# Patient Record
Sex: MALE | Race: WHITE
[De-identification: names, ages, dates, MRNs, and addresses within clinical notes are randomized per-mention and may not be internally consistent; named-entity substitution may affect disease eponyms.]

---

## 2023-08-31 ENCOUNTER — HOSPITAL ENCOUNTER
Age: 53
Discharge: HOME | End: 2023-08-31
Payer: COMMERCIAL

## 2023-08-31 DIAGNOSIS — R19.7: Primary | ICD-10-CM

## 2023-08-31 LAB — C. DIFFICILE PCR: NEGATIVE

## 2023-08-31 PROCEDURE — 87045 FECES CULTURE AEROBIC BACT: CPT

## 2023-08-31 PROCEDURE — 36415 COLL VENOUS BLD VENIPUNCTURE: CPT

## 2023-08-31 PROCEDURE — 87046 STOOL CULTR AEROBIC BACT EA: CPT

## 2023-08-31 PROCEDURE — 87427 SHIGA-LIKE TOXIN AG IA: CPT

## 2023-08-31 PROCEDURE — 87798 DETECT AGENT NOS DNA AMP: CPT

## 2023-08-31 PROCEDURE — 83631 LACTOFERRIN FECAL (QUANT): CPT

## 2023-08-31 PROCEDURE — 87493 C DIFF AMPLIFIED PROBE: CPT

## 2023-08-31 PROCEDURE — 87425 ROTAVIRUS AG IA: CPT

## 2023-08-31 PROCEDURE — 87177 OVA AND PARASITES SMEARS: CPT

## 2023-08-31 PROCEDURE — 87209 SMEAR COMPLEX STAIN: CPT

## 2023-08-31 PROCEDURE — G0328 FECAL BLOOD SCRN IMMUNOASSAY: HCPCS

## 2024-10-24 ENCOUNTER — HOSPITAL ENCOUNTER
Dept: HOSPITAL 101 - LAB | Age: 54
Discharge: HOME | End: 2024-10-24
Payer: COMMERCIAL

## 2024-10-24 DIAGNOSIS — Z12.5: ICD-10-CM

## 2024-10-24 DIAGNOSIS — Z00.00: Primary | ICD-10-CM

## 2024-10-24 DIAGNOSIS — R63.5: ICD-10-CM

## 2024-10-24 DIAGNOSIS — R73.9: ICD-10-CM

## 2024-10-24 LAB — THYROID STIMULATING HORMONE: 2.66 UIU/ML (ref 0.36–3.74)

## 2024-10-24 PROCEDURE — 36415 COLL VENOUS BLD VENIPUNCTURE: CPT

## 2024-10-24 PROCEDURE — G0103 PSA SCREENING: HCPCS

## 2024-10-24 PROCEDURE — 83036 HEMOGLOBIN GLYCOSYLATED A1C: CPT

## 2024-10-24 PROCEDURE — 84443 ASSAY THYROID STIM HORMONE: CPT

## 2024-10-28 ENCOUNTER — HOSPITAL ENCOUNTER
Dept: HOSPITAL 101 - LAB | Age: 54
Discharge: HOME | End: 2024-10-28
Payer: COMMERCIAL

## 2024-10-28 DIAGNOSIS — R97.20: Primary | ICD-10-CM

## 2024-10-28 PROCEDURE — 84154 ASSAY OF PSA FREE: CPT

## 2024-10-28 PROCEDURE — 36415 COLL VENOUS BLD VENIPUNCTURE: CPT

## 2024-10-28 PROCEDURE — 84153 ASSAY OF PSA TOTAL: CPT

## 2024-10-28 NOTE — XMS_ITS
Comprehensive CCD (C-CDA v2.1)  
  
                          Created on: 2024  
  
  
Harish Sargent  
External Reference #: CDR,PersonID:[id number]  
: 1970  
Sex: Male  
  
Demographics  
  
  
                                        Address             [address] [place] [address]  [address]  
   
                                        Mobile Phone        [phone]  
   
                                        Preferred Language  en  
   
                                        Marital Status        
   
                                        Orthodoxy Affiliation Unknown  
   
                                        Race                White  
   
                                        Ethnic Group        Not  or Lati  
no  
  
  
Author  
  
  
                                        Organization        [facility]  
  
  
Care Team Providers  
  
  
                                Care Team Member Name Role            Phone  
   
                                DR SHARRON GRANT Primary Care    Unavailable  
   
                                [employer]      Unavailable  
   
                                BLAKE MCINTOSH Attending       Unavailable  
   
                                ARNALDO, BLAKE Admitting       Unavailable  
   
                                ARNALDO, BLAKE Consulting      Unavailable  
   
                                FilipponeKarin Consult[employer]      Unavailable  
   
                                ParkinsonIssac bolton   Consulting      Unavailable  
   
                                JUANITA, DR MCDERMOTT Consulting      Unavailable  
   
                                JUANITA, DR MCDERMOTT Primary Care    Unavailable  
   
                                JUANITA, DR MCDERMOTT Admitting       Unavailable  
   
                                JUANITA, DR MCDERMOTT Attending       Unavailable  
   
                                JUANITA, DR MCDERMOTT Consulting      Unavailable  
   
                                JUANITA, DR MCDERMOTT Primary Care    Unavailable  
   
                                JUANITA, DR MCDERMOTT Admitting       Unavailable  
   
                                JUANITA, DR MCDERMOTT Attending       Unavailable  
   
                                FABRICE BARNEY Consulting      Unavailable  
   
                                Sharron Grant DO Primary Care Provider [phone]  
   
                                Sharron Grant DO Primary Care Provider [phone]  
   
                                Sharron Grant DO Primary Care Provider [phone]   
   
                                SHARRON GRANT Primary Care    Unavailable  
   
                                CHAKA HERMAN Referring       Unavailable  
   
                                CHAKA HERMAN Attending       Unavailable  
   
                                CHAKA HERMAN Referring       Unavailable  
   
                                SHARRON GRANT Primary Care    Unavailable  
   
                                SHARRON GRANT Primary Care    Unavailable  
  
  
  
Medications  
Current Medications  
  
  
  
                      Medication Drug Class(es) Dates      Sig (Normalized) Sig   
(Original)  
   
                                                    apixaban 5 mg oral   
tablet  
(10 sources)                            Factor Xa   
Inhibitor                               Start:   
10-                              take 5 mg by mouth   
once daily                              Apixaban Active 5   
MG PO Daily   
2024 12:00am  
  
  
  
                                        Start: 2024   take 1 tablet by dora  
th twice   
daily                                   apixaban (ELIQUIS) 5 mg tab(s)   
Indications: Chronic anticoagulation   
Take 1 tablet by mouth two times a   
day. 0 2024 Active  
   
                                                    Start: 2021  
End: 2023                         take 1 tablet by mouth twice   
daily                                   apixaban (ELIQUIS) 5 mg tab(s) Take 1   
tablet by mouth twice daily. 180   
tablet 1 2022 Active  
  
  
  
                                        Comment on above:   Take 1 tablet by dora  
th twice daily.   
   
                                                    ascorbic acid 500 mg   
oral capsule  
(1 source)                Vitamin C                 Start:   
10-                              take 500 mg by   
mouth once   
daily                                   Ascorbic Acid   
(Vitamin C) Active   
500 MG PO daily    
12:00am  
   
                                                    omeprazole 40 mg   
delayed release oral   
capsule  
(11 sources)                            Proton Pump   
Inhibitor                               Start:   
2021  
End:   
10-                                          omeprazole   
(PRILOSEC) 40 mg   
capsule  
  
  
  
                                                    Start: 04-  
End: 2021                         take 40 mg by mouth twice   
daily                                   Omeprazole Discontinued 40 MG PO   
Twice daily 168 84 2021   
12:00am 2021 10:08am  
  
  
  
                                                    zinc glycinate  
(1 source)                              Start: 10-   take 7.5 mg by mouth  
   
once daily                              Zinc Glycinate Active 7.5   
MG PO daily  12:00am PT TAKES   
OTC ZINC DAILY  
  
  
  
Completed/Discontinued Medications  
  
  
  
                      Medication Drug Class(es) Dates      Sig (Normalized) Sig   
(Original)  
   
                                                    azithromycin 250 mg   
oral tablet  
(1 source)                              Macrolide   
Antimicrobial                           Start:   
10-                                          azithromycin   
(ZITHROMAX) 250 mg   
tablet  
  
  
  
Problems  
Active Problems  
  
  
                      Problem Classification Problem    Date       Documented Da  
te Episodic/Chronic  
   
                                                    Deficiency and other   
anemia  
(11 sources)                            Iron deficiency   
anemia due to blood   
loss; Translations:   
[Iron deficiency   
anemia secondary to   
blood loss   
(chronic)]                              Onset:   
2021                Chronic  
   
                                                    Deficiency and other   
anemia  
(8 sources)                             Anemia due to blood   
loss; Translations:   
[Iron deficiency   
anemia secondary to   
blood loss   
(chronic)]                              Onset:   
2021                Chronic  
   
                                                    Deficiency and other   
anemia  
(4 sources)                             Iron deficiency   
anemia,   
unspecified;   
Translations: [IRON   
DEFICIENCY ANEMIA   
UNSPECIFIED]                            Onset:   
2021                                          Episodic  
   
                                                    Deficiency and other   
anemia  
(10 sources)                            Anemia;   
Translations:   
[Anemia,   
unspecified]                            Onset:   
2021                Episodic  
   
                                                    Deficiency and other   
anemia  
(1 source)                              Iron deficiency   
anemia;   
Translations: [Iron   
deficiency anemia,   
unspecified]                            10-          Episodic  
   
                                                    Diabetes mellitus   
without complication  
(2 sources)                             Hyperglycemia;   
Translations:   
[Hyperglycemia,   
unspecified]                            10-          Episodic  
   
                                                    Malaise and fatigue  
(3 sources)                             Weakness;   
Translations:   
[WEAKNESS]                              Onset:   
2021                                          Episodic  
   
                                                    Other aftercare  
(5 sources)                             Long-term current   
use of   
anticoagulant;   
Translations: [Long   
term (current) use   
of anticoagulants]                      Onset:   
2023                                          Episodic  
   
                                                    Other and ill-defined   
heart disease  
(1 source)                              Other ill-defined   
heart diseases;   
Translations:   
[OTHER ILL-DEFINED   
HEART DISEASES]                         Onset:   
2021                                          Chronic  
   
                                                    Other gastrointestinal   
disorders  
(11 sources)                            Malabsorption -   
iron; Translations:   
[Other intestinal   
malabsorption]                          Onset:   
2021                Chronic  
   
                                                    Other injuries and   
conditions due to   
external causes  
(1 source)                              Injury of eye   
region;   
Translations:   
[Unspecified injury   
of unspecified eye   
and orbit, initial   
encounter]                              2021          Episodic  
   
                                                    Other liver diseases  
(1 source)                              Abnormal levels of   
other serum   
enzymes;   
Translations:   
[ABNORMAL LEVELS   
OTHER SERUM   
ENZYMES]                                Onset:   
2021                                          Episodic  
   
                                                    Other lower respiratory   
disease  
(1 source)                              Dyspnea;   
Translations:   
[Dyspnea,   
unspecified]                            2023          Episodic  
   
                                                    Other screening for   
suspected conditions   
(not mental disorders   
or infectious disease)  
(2 sources)                             Patient encounter   
status;   
Translations:   
[Encounter for   
screening for   
malignant neoplasm   
of prostate]                            10-          Episodic  
   
                                                    Phlebitis;   
thrombophlebitis and   
thromboembolism  
(5 sources)                             Bilateral deep vein   
thrombosis of lower   
extremities;   
Translations:   
[Acute embolism and   
thrombosis of   
unspecified deep   
veins of proximal   
lower extremity,   
bilateral]                                                  Episodic  
   
                                                    Pulmonary heart disease  
(1 source)                              Saddle embolus of   
pulmonary artery   
without acute cor   
pulmonale;   
Translations:   
[SADDLE EMBOL PA   
W/O AC COR   
PULMONAL]                               Onset:   
2021                                          Chronic  
   
                                                    Unclassified  
(1 source)                              CONTACT W/AND   
(SUSP) EXPOS   
COVID-19;   
Translations:   
[CONTACT W/AND   
(SUSP) EXPOS   
COVID-19]                               Onset:   
2021                                            
  
  
Past or Other Problems  
  
  
                                                    Problem   
Classification  Problem         Date            Documented Date Episodic/Chronic  
   
                                                    Abdominal hernia  
(1 source)                              Diaphragmatic hernia   
without obstruction   
or gangrene;   
Translations: [DIAPH   
HERNIA W/O   
OBST/GANGRENE]                          Onset:   
04-                                          Episodic  
   
                                                    Chronic obstructive   
pulmonary disease and   
bronchiectasis  
(1 source)                              Bronchitis, not   
specified as acute   
or chronic;   
Translations:   
[BRONCHITIS NOT SPEC   
AS ACUTE/CHRON]                         Onset:   
04-                                          Episodic  
   
                                                    Nonspecific chest   
pain  
(1 source)                              Other chest pain;   
Translations: [OTHER   
CHEST PAIN]                             Onset:   
04-                                          Episodic  
   
                                                    Other lower   
respiratory disease  
(4 sources)                             Other forms of   
dyspnea;   
Translations: [OTHER   
FORMS OF DYSPNEA]                       Onset:   
2021                                          Episodic  
   
                                                    Other nutritional;   
endocrine; and   
metabolic disorders  
(1 source)                              Abnormal weight   
loss; Translations:   
[ABNORMAL WEIGHT   
LOSS]                                   Onset:   
04-                                          Episodic  
   
                                                    Pulmonary heart   
disease  
(4 sources)                             Infarction of lung   
due to embolus;   
Translations: [Other   
pulmonary embolism   
without acute cor   
pulmonale]                              Onset:   
2023                                          Episodic  
  
  
  
Results  
  
  
                          Test Name    Value        Interpretation Reference   
Range                                   Facility  
   
                                                    CBC W Auto Differential pane  
l (Bld)on 2024   
   
                                                    Basophils (Bld)   
[#/Vol]         0.04 10*3/uL    Normal          <0.11           [facility]  
   
                                        Comment on above:   Order Comment: Speci  
men Type: BLOOD SPECIMEN  
Ordering Facility: [facility]  
Address: [address] [place] [address]   
   
                                                            Performed By: #### 5  
7021-8 ####  
[facility]  
CLIA [id number]  
[address] [place] [address]   
   
                                                    Basophils/100 WBC   
(Bld)           0.9 %           Normal                          [facility]  
   
                                        Comment on above:   Order Comment: Speci  
men Type: BLOOD SPECIMEN  
Ordering Facility: [facility]  
Address: [address] [place] [address]   
   
                                                            Performed By: #### 5  
7021-8 ####  
[facility]  
CLIA [id number]  
[address] [place] [address]   
   
                                                    Differential cell   
count method Nom   
(Bld)           Auto            Normal                          [facility]  
   
                                        Comment on above:   Order Comment: Speci  
men Type: BLOOD SPECIMEN  
Ordering Facility: [facility]  
Address: [facility] [place] [address]   
   
                                                            Performed By: #### 5  
7021-8 ####  
[facility]  
CLIA [id number]  
[address] [place] [address]   
   
                                                    Eosinophils (Bld)   
[#/Vol]         0.13 10*3/uL    Normal          <0.46           [facility]  
   
                                        Comment on above:   Order Comment: Speci  
men Type: BLOOD SPECIMEN  
Ordering Facility: [facility]  
Address: [address] [place] [address]   
   
                                                            Performed By: #### 5  
7021-8 ####  
[facility]  
CLIA [id number]  
[address] [place] [address]   
   
                                                    Eosinophils/100 WBC   
(Bld)           2.8 %           Normal                          [facility]  
   
                                        Comment on above:   Order Comment: Speci  
men Type: BLOOD SPECIMEN  
Ordering Facility: [facility]  
Address: [address] [place] [address]   
   
                                                            Performed By: #### 5  
7021-8 ####  
[facility]  
CLIA [id number]  
[address] [place] [address]   
   
                                                    Erythrocyte   
distribution width   
(RBC) [Ratio]   15.2 %          High            11.5-15.0       [facility]  
   
                                        Comment on above:   Order Comment: Speci  
men Type: BLOOD SPECIMEN  
Ordering Facility: [facility]  
Address: [address] [place] [address]   
   
                                                            Performed By: #### 5  
7021-8 ####  
[facility]  
CLIA [id number]  
[address] [place] [address]   
   
                                                    Hematocrit (Bld)   
[Volume fraction] 46.2 %          Normal          39.0-51.0       [facility]  
   
                                        Comment on above:   Order Comment: Speci  
men Type: BLOOD SPECIMEN  
Ordering Facility: [facility]  
Address: [address] [place] [address]   
   
                                                            Performed By: #### 5  
7021-8 ####  
[facility]  
CLIA [id number]  
[address] [place] [address]   
   
                                                    Hemoglobin (Bld)   
[Mass/Vol]      15.0 g/dL       Normal          13.0-17.0       [facility]  
   
                                        Comment on above:   Order Comment: Speci  
men Type: BLOOD SPECIMEN  
Ordering Facility: [facility]  
Address: [address] [place] [address]   
   
                                                            Performed By: #### 5  
7021-8 ####  
[facility]  
CLIA [id number]  
[address] [place] [address]   
   
                                                    Immature granulocytes   
(Bld) [#/Vol]   10*3/uL         Normal          <0.10           [facility]  
   
                                        Comment on above:   Order Comment: Speci  
men Type: BLOOD SPECIMEN  
Ordering Facility: [facility]  
Address: [address] [place] [address]   
   
                                                            Performed By: #### 5  
7021-8 ####  
[facility]  
CLIA [id number]  
[address] [place] [address]   
   
                                                    Immature   
granulocytes/100 WBC   
(Bld)           0.4 %           Normal                          [facility]  
   
                                        Comment on above:   Order Comment: Speci  
men Type: BLOOD SPECIMEN  
Ordering Facility: [facility]  
Address: [address] [place] [address]   
   
                                                            Performed By: #### 5  
7021-8 ####  
[facility]  
CLIA [id number]  
[address] [place] [address]   
   
                                                    Lymphocytes (Bld)   
[#/Vol]         1.15 10*3/uL    Normal          1.00-4.00       [facility]  
   
                                        Comment on above:   Order Comment: Speci  
men Type: BLOOD SPECIMEN  
Ordering Facility: [facility]  
Address: [address] [place] [address]   
   
                                                            Performed By: #### 5  
7021-8 ####  
[facility]  
CLIA [id number]  
[address] [place] [address]   
   
                                                    Lymphocytes/100 WBC   
(Bld)           24.6 %          Normal                          [facility]  
   
                                        Comment on above:   Order Comment: Speci  
men Type: BLOOD SPECIMEN  
Ordering Facility: [facility]  
Address: [address] [place] [address]   
   
                                                            Performed By: #### 5  
7021-8 ####  
[facility]  
CLIA [id number]  
[address] [place] [address]   
   
                                                    MCH (RBC) [Entitic   
mass]           29.8 pg         Normal          26.0-34.0       [facility]  
   
                                        Comment on above:   Order Comment: Speci  
men Type: BLOOD SPECIMEN  
Ordering Facility: [facility]  
Address: [address] [place] [address]   
   
                                                            Performed By: #### 5  
7021-8 ####  
[facility]  
CLIA [id number]  
[address] [place] [address]   
   
                      MCHC (RBC) [Mass/Vol] 32.5 g/dL  Normal     30.5-36.0  [facility]  
   
                                        Comment on above:   Order Comment: Speci  
men Type: BLOOD SPECIMEN  
Ordering Facility: [facility]  
Address: [address] [place] [address]   
   
                                                            Performed By: #### 5  
7021-8 ####  
[facility]  
CLIA [id number]  
[address] [place] [address]   
   
                                                    MCV (RBC) [Entitic   
vol]            91.7 fL         Normal          80.0-100.0      [facility]  
   
                                        Comment on above:   Order Comment: Speci  
men Type: BLOOD SPECIMEN  
Ordering Facility: [facility]  
Address: [address] [place] [address]   
   
                                                            Performed By: #### 5  
7021-8 ####  
[facility]  
CLIA [id number]  
[address] [place] [address]   
   
                                                    Monocytes (Bld)   
[#/Vol]         0.55 10*3/uL    Normal          <0.87           [facility]  
   
                                        Comment on above:   Order Comment: Speci  
men Type: BLOOD SPECIMEN  
Ordering Facility: [facility]  
Address: [address] [place] [address]   
   
                                                            Performed By: #### 5  
7021-8 ####  
[facility]  
CLIA [id number]  
[address] [place] [address]   
   
                                                    Monocytes/100 WBC   
(Bld)           11.8 %          Normal                          [facility]  
   
                                        Comment on above:   Order Comment: Speci  
men Type: BLOOD SPECIMEN  
Ordering Facility: [facility]  
Address: [address] [place] [address]   
   
                                                            Performed By: #### 5  
7021-8 ####  
[facility]  
CLIA [id number]  
[address] [place] [address]   
   
                                                    Neutrophils (Bld)   
[#/Vol]         2.78 10*3/uL    Normal          1.45-7.50       [facility]  
   
                                        Comment on above:   Order Comment: Speci  
men Type: BLOOD SPECIMEN  
Ordering Facility: [facility]  
Address: [address] [place] [address]   
   
                                                            Performed By: #### 5  
7021-8 ####  
[facility]  
CLIA [id number]  
[address] [place] [address]   
   
                                                    Neutrophils/100 WBC   
(Bld)           59.5 %          Normal                          [facility]  
   
                                        Comment on above:   Order Comment: Speci  
men Type: BLOOD SPECIMEN  
Ordering Facility: [facility]  
Address: [address] [place] [address]   
   
                                                            Performed By: #### 5  
7021-8 ####  
[facility]  
CLIA [id number]  
[address] [place] [address]   
   
                                                    Nucleated RBC (Bld)   
[#/Vol]         10*3/uL         Normal          <0.01           [facility]  
   
                                        Comment on above:   Order Comment: Speci  
men Type: BLOOD SPECIMEN  
Ordering Facility: [facility]  
Address: [address] [place] [address]   
   
                                                            Performed By: #### 5  
7021-8 ####  
[facility]  
CLIA [id number]  
[address] [place] [address]   
   
                                                    Nucleated RBC/100 WBC   
(Bld) [Ratio]   0.0 /100 WBC    Normal                          [facility]  
   
                                        Comment on above:   Order Comment: Speci  
men Type: BLOOD SPECIMEN  
Ordering Facility: [facility]  
Address: [address] [place] [address]   
   
                                                            Performed By: #### 5  
7021-8 ####  
[facility]  
CLIA [id number]  
[address] [place] [address]   
   
                                                    Platelet mean volume   
(Bld) [Entitic vol] 10.7 fL         Normal          9.0-12.7        [facility]  
   
                                        Comment on above:   Order Comment: Speci  
men Type: BLOOD SPECIMEN  
Ordering Facility: [facility]  
Address: [address] [place] [address]   
   
                                                            Performed By: #### 5  
7021-8 ####  
[facility]  
CLIA [id number]  
[address] [place] [address]   
   
                                                    Platelets (Bld)   
[#/Vol]         209 10*3/uL     Normal          150-400         [facility]  
   
                                        Comment on above:   Order Comment: Speci  
men Type: BLOOD SPECIMEN  
Ordering Facility: [facility]  
Address: [address] [place] [address]   
   
                                                            Performed By: #### 5  
7021-8 ####  
[facility]  
CLIA [id number]  
[address] [place] [address]   
   
                      RBC (Bld) [#/Vol] 5.04 10*6/uL Normal     4.20-6.00  [facility]  
   
                                        Comment on above:   Order Comment: Speci  
men Type: BLOOD SPECIMEN  
Ordering Facility: [facility]  
Address: [address] [place], [place] [id number]   
   
                                                            Performed By: #### 5  
7021-8 ####  
[facility]  
CLIA [id number]  
[address] [place] [address]   
   
                      WBC (Bld) [#/Vol] 4.67 10*3/uL Normal     3.70-11.00 [facility]  
   
                                        Comment on above:   Order Comment: Speci  
men Type: BLOOD SPECIMEN  
Ordering Facility: [facility]  
Address: [address] [place], [place] [id number]   
   
                                                            Performed By: #### 5  
7021-8 ####  
[facility]  
CLIA [id number]  
[address] [place] [address]   
   
                                                    CNOVSPon 2024   
   
                                        CNOVS              Visit (SP) Office   
(HEMASA)  
--------------------  
HARISH SARGENT   
([id number])   
1970 M  
Date Time Provider   
Department  
24 9:00 AM   
CHAKA HERMAN  
During your visit   
today, we recorded   
the following   
information about   
you:  
Temperature Pulse   
Respiration Blood   
pressure  
97.4 degrees   
91/minute 16/minute   
134/86  
Weight Height  
97.9 kg 1.753 m  
Chaka Herman MD   
2024 8:14 AM   
Signed  
NAME: Harish Sargent  
CLINIC NO.: [id number]  
DATE OF SERVICE:   
2024   
(Ishmael)  
Some elements in   
this clinic note   
that are critical to   
medical decision   
making  
have been carefully   
reviewed and   
included from a   
prior clinic note   
dated: May  
12, 2023 (Ishmael)  
Referring Provider:   
Sharron Grant  
Additional   
Clinicians involved   
in Harish Sargent's   
care:  
DIAGNOSIS:  
Iron deficiency   
anemia  
ASSESSMENT: 54 year   
old man with   
significant anemia   
that is likely   
chronic and  
related to prior GI   
bleed. He does not   
appear to have   
additional bleeding  
although he is now   
anticoagulated since   
2021 for saddle   
embolus and left  
lower extremity DVT.   
He is very energetic   
and doesn't appear   
to have any  
decrease in exercise   
tolerance indicating   
an adapted state to   
his anemia. He  
also has a healthy   
diet with plenty of   
iron consumption   
indicating a likely  
malabsorptive   
condition to iron in   
addition to his   
prior loss of blood.   
His  
hypercoagulable   
workup was negative.  
There does not   
appear to be an   
underlying malignant   
process.  
Great response to   
iron. However   
several months later   
I suspect that he   
has  
underlying   
malabsorption in   
addition to chronic   
blood loss. This   
appears to  
have resolved.  
Suspect elevated   
creatinine is   
dependent on how   
well he hydrates.   
May need  
further workup on   
return.  
Continue Eliquis for   
now (PE - symptoms   
have resolved)  
PLAN:  
Labs, including iron   
studies, in 6 months  
RTC 1 week after  
Continue   
anticoagulation   
given massive PE  
____________________  
___________________-  
________  
HPI:  
CASE HISTORY:   
Reverse   
Chronological Order  
2023 - CBC   
6.77 > 16.1/49.1 <   
250, CMP normal,   
Iron 135, TIBC 448,  
transferrin   
saturation 30.1%,   
ferritin 20, B12   
431, folate 13.3  
2022 - 2nd   
dose Monoferric  
2022 - Volume   
associated renal   
failure / ATN -   
corrects with oral  
hydration  
2021 -   
Monoferric  
2021 - Persisting   
microcytic anemia  
10/2021 -   
Hypercoagulable   
evaluation negative  
2021 -   
Released from   
[facility]   
after Heparinization   
and long term  
anticoagulation.   
Refused Hematology   
consult.  
2021 - Lower   
extremity doppler +   
Left lower ext DVT   
of dist Femoral,  
popliteal and   
peroneal veins  
2021 -   
Bilateral PE and   
saddle embolus on   
CTA. Mildly   
prominent hilar  
and mediastinal   
nodes noted.  
2021 - CT   
Abdomen unremarkable   
except left   
hydronephrosis due   
to chronic  
UPJ obstruction  
2021 - Dr. Araiza repeat EGD -   
erosive esophagitis,   
hiatal hernia, no  
Lee's.  
2021 - ER   
evaluation for   
fatigue and dyspnea   
severe anemia -   
found to  
have esophageal   
ulcer  
Updated Visit, 2024:  
Harish returns today   
for a follow up. He   
has returned to work   
- he is a  
supervisor at [employer]. He does   
have significant   
exposure to lead, he  
endorses wearing   
PPE. He now takes   
zinc, vitamin C, and   
iron supplements on   
top  
of Eliquis. He no   
longer takes   
omeprazole as his   
GERD has improved.   
His CBC  
remains normal from   
my perspective.  
Updated Visit, May   
12, 2023:  
Harish continues to do   
very well. H/H   
completely normal   
now. Unsure what the  
cause of his initial   
loss and anemia was   
but likely had   
chronic bleeding   
from  
erosive esophagitis.  
Updated Visit,   
2022:   
Telephone only for 5   
minutes  
Called Harish as   
requested. He feels   
fantastic. Still on   
anticoagulation. CBC  
remains corrected   
with hgb 16. Will   
discuss d/c'ing   
anticoagulation at   
the next  
visit.  
Updated Visit, May   
13, 2022:  
Harish returns and   
feels great - his   
H/H has recovered   
fully. He remains on  
anticoagulation but   
is interested in   
stopping. Given the   
severity of his  
thrombotic events, I   
would recommend that   
he stay on for at   
tleast 1 year. He  
believes that   
COVID-19 was the   
cause of his DVT and   
pulmonary emboli. No   
other  
illiciting factors   
have been   
discovered.  
Updated Visit,   
2022:  
Continued   
improvement in   
Hgb/Hct.  
Still has microcytic   
indices.  
Still feels that he   
has no symptoms  
Unaware of kidney   
dysfunction -   
corrected after   
increasing H2O   
untake but now  
is worse again.  
May need renal   
workup, but patient   
would prefer to try   
and hydrate on his   
own  
and then reconsider.  
Will continue iron   
infusion today.  
Updated Visit,   
2022:  
Impressive   
improvement in H/H   
with iron infusion.   
CMP wasn't back   
until after  
he left. Will need   
to repeat next week   
to ensure he doesn't   
have ARF. He has  
no signs or symptoms   
of CHF. Will   
anticipate   
anticoagulation for   
1 year and  
then consi (more   
content not   
included)...        Normal                                  [facility]  
   
                                                    Comprehensive metabolic 2000  
 panelon 2024   
   
                      Albumin [Mass/Vol] 4.5 g/dL   Normal     3.9-4.9    [facility]  
   
                                        Comment on above:   Order Comment: Speci  
men Type: BLOOD SPECIMEN  
Ordering Facility: [facility]  
Address: [address] [place] [address]   
   
                                                            Performed By: #### 2  
4323-8 ####  
[facility]  
CLIA [id number]  
[address] [place] [address]   
   
                                                    ALP [Catalytic   
activity/Vol]   74 U/L          Normal                    [facility]  
   
                                        Comment on above:   Order Comment: Speci  
men Type: BLOOD SPECIMEN  
Ordering Facility: [facility]  
Address: [address] [place] [address]   
   
                                                            Performed By: #### 2  
4323-8 ####  
[facility]  
CLIA [id number]  
[address] [place] [address]   
   
                                                    ALT [Catalytic   
activity/Vol]   16 U/L          Normal          10-54           [facility]  
   
                                        Comment on above:   Order Comment: Speci  
men Type: BLOOD SPECIMEN  
Ordering Facility: [facility]  
Address: [address] [place] [address]   
   
                                                            Performed By: #### 2  
4323-8 ####  
[facility]  
CLIA [id number]  
[address] [place] [address]   
   
                      Anion gap [Moles/Vol] 10 mmol/L  Normal     8-15       [facility]  
   
                                        Comment on above:   Order Comment: Speci  
men Type: BLOOD SPECIMEN  
Ordering Facility: [facility]  
Address: [address] [place] [address]   
   
                                                            Performed By: #### 2  
4323-8 ####  
[facility]  
CLIA [id number]  
[address] [place] [address]   
   
                                                    AST [Catalytic   
activity/Vol]   21 U/L          Normal          14-40           [facility]  
   
                                        Comment on above:   Order Comment: Speci  
men Type: BLOOD SPECIMEN  
Ordering Facility: [facility]  
Address: [address] [place], [place] [address]   
   
                                                            Performed By: #### 2  
4323-8 ####  
[facility]  
CLIA [id number]  
[address] [place] [address]   
   
                      Bilirubin [Mass/Vol] 0.8 mg/dL  Normal     0.2-1.3    [facility]  
   
                                        Comment on above:   Order Comment: Speci  
men Type: BLOOD SPECIMEN  
Ordering Facility: [facility]  
Address: [address] [place] [address]   
   
                                                            Performed By: #### 2  
4323-8 ####  
[facility]  
CLIA [id number]  
[address] [place] [address]   
   
                      Calcium [Mass/Vol] 10.0 mg/dL Normal     8.5-10.2   [facility]  
   
                                        Comment on above:   Order Comment: Speci  
men Type: BLOOD SPECIMEN  
Ordering Facility: [facility]  
Address: [address] [place] [address]   
   
                                                            Performed By: #### 2  
4323-8 ####  
[facility]  
CLIA [id number]  
[address] [place] [address]   
   
                      Chloride [Moles/Vol] 107 mmol/L Normal          [facility]  
   
                                        Comment on above:   Order Comment: Speci  
men Type: BLOOD SPECIMEN  
Ordering Facility: [facility]  
Address: [address] [place] [address]   
   
                                                            Performed By: #### 2  
4323-8 ####  
[facility]  
CLIA [id number]  
[address] [place] [address]   
   
                      CO2 [Moles/Vol] 27 mmol/L  Normal     22-30      [facility]  
   
                                        Comment on above:   Order Comment: Speci  
men Type: BLOOD SPECIMEN  
Ordering Facility: [facility]  
Address: [address] [place] [address]   
   
                                                            Performed By: #### 2  
4323-8 ####  
[facility]  
CLIA [id number]  
[address] [place] [address]   
   
                      Creatinine [Mass/Vol] 1.10 mg/dL Normal     0.73-1.22  [facility]  
   
                                        Comment on above:   Order Comment: Speci  
men Type: BLOOD SPECIMEN  
Ordering Facility: [facility]  
Address: [address] [place] [address]   
   
                                                            Performed By: #### 2  
4323-8 ####  
[facility]  
CLIA [id number]  
[address] [place] [address]   
   
                                                    Creatinine and   
Glomerular filtration   
rate.predicted panel   
(S/P/Bld)       79 mL/min/1.73m??? Normal          >=60            [facility]  
   
                                        Comment on above:   Order Comment: Speci  
men Type: BLOOD SPECIMEN  
Ordering Facility: [facility]  
Address: [address] [place] [address]   
   
                                                            Result Comment: Teresa  
mated Glomerular Filtration Rate (eGFR) is   
calculated using the  CKD-EPI creatinine equation. This   
equation utilizes serum creatinine, sex, and age as parameters.   
The creatinine assay has traceable calibration to isotope   
dilution-mass spectrometry. Refer to KDIGO guidelines for   
clinical interpretation. In patients with unstable renal   
function, e.g. those with acute kidney injury, the eGFR may not   
accurately reflect actual GFR.   
   
                                                            Performed By: #### 2  
4323-8 ####  
[facility]  
CLIA [id number]  
[address] [place] [address]   
   
                      Glucose [Mass/Vol] 110 mg/dL  High       74-99      [facility]  
   
                                        Comment on above:   Order Comment: Speci  
men Type: BLOOD SPECIMEN  
Ordering Facility: [facility]  
Address: [address] [place] [address]   
   
                                                            Result Comment: The   
American Diabetes Association (ADA) provides   
guidance for cutoff values for fasting glucose and random   
glucose. The ADA defines fasting as no caloric intake for at   
least 8 hours. Fasting plasma glucose results between 100 to 125   
mg/dL indicate increased risk for diabetes (prediabetes).  
Fasting plasma glucose results greater than or equal to 126   
mg/dL meet the criteria for diagnosis of diabetes. In the   
absence of unequivocal hyperglycemia, results should be   
confirmed by repeat testing. In a patient with classic symptoms   
of hyperglycemia or hyperglycemic crisis, random plasma glucose   
results greater than or equal to 200 mg/dL meet the criteria for   
diagnosis of diabetes.  
Reference: Standards of Medical Care in Diabetes 2016, American   
Diabetes Association. Diabetes Care. 2016.39(Suppl 1).   
   
                                                            Performed By: #### 2  
4323-8 ####  
[facility]  
CLIA [id number]  
[address] [place] [address]   
   
                      Potassium [Moles/Vol] 4.5 mmol/L Normal     3.7-5.1    [facility]  
   
                                        Comment on above:   Order Comment: Speci  
men Type: BLOOD SPECIMEN  
Ordering Facility: [facility]  
Address: [address] [place] [address]   
   
                                                            Performed By: #### 2  
4323-8 ####  
[facility]  
CLIA [id number]  
[address] [place] [address]   
   
                      Protein [Mass/Vol] 7.0 g/dL   Normal     6.3-8.0    [facility]  
   
                                        Comment on above:   Order Comment: Speci  
men Type: BLOOD SPECIMEN  
Ordering Facility: [facility]  
Address: [address] [id number]   
   
                                                            Performed By: #### 2  
4323-8 ####  
[facility]  
CLIA [id number]  
[address] [place] [address]   
   
                      Sodium [Moles/Vol] 144 mmol/L Normal     136-144    [facility]  
   
                                        Comment on above:   Order Comment: Speci  
men Type: BLOOD SPECIMEN  
Ordering Facility: [facility]  
Address: [address] [place] [address]   
   
                                                            Performed By: #### 2  
4323-8 ####  
[facility]  
CLIA [id number]  
[address] [place] [address]   
   
                                                    Urea nitrogen   
[Mass/Vol]      17 mg/dL        Normal          9-24            [facility]  
   
                                        Comment on above:   Order Comment: Speci  
men Type: BLOOD SPECIMEN  
Ordering Facility: [facility]  
Address: [address] [place] [address]   
   
                                                            Performed By: #### 2  
4323-8 ####  
[facility]  
CLIA [id number]  
[address] [place] [address]   
   
                                                    Aleksandar 2024   
   
                                        CNPN                Telephone (HEMASA)  
--------------------  
HARISH SARGENT   
([id number])   
1970 M  
Date Time Provider   
Department  
24 BEAU MAYORGA  
During your visit   
today, we recorded   
the following   
information about   
you:  
Beau Mayorga MA   
2024 10:08 AM   
Signed  
Patient has an appt   
on . Would you   
like labs?  
Allergies As of   
Date: 2024  
(No Known Allergies)  
Date Reviewed:   
2023  
Reviewed by:   
Halina Leon MA   
- Fully Assessed  
Reason for Visit:  
Lab Orders [1688]  
Primary Visit   
Diagnosis:Chronic   
anticoagulation   
[Z79.01]  
Other Visit   
Diagnoses:Pulmonary   
embolus with   
infarction (HCC)   
[I26.99]  
Deep vein thrombosis   
(DVT) of proximal   
vein of both  
lower extremities,   
unspecified   
chronicity (HCC)  
[I82.4Y3]  
Poor iron absorption   
[K90.89]  
Order(s):COMPLETE   
BLOOD COUNT AND   
DIFFERENTIAL   
[SQCBCDIF] Order #:   
[id number]  
FUTURE  
COMPREHENSIVE   
METABOLIC PANEL   
[SQCMP] Order #:   
[id number] FUTURE  
Prescriptions as of   
2024  
- omeprazole   
(PRILOSEC) 40 mg   
capsule  
Problem List As Of   
Date 2024   
Noted Resolved  
Absolute anemia   
[D64.9] 2021  
Iron deficiency   
anemia due to   
chronic blood   
los*2021  
Poor iron absorption   
[K90.89] 2021  
Anemia due to GI   
blood loss [D50.0]   
2021  
Chronic   
anticoagulation   
[Z79.01] 2023  
Pulmonary embolus   
with infarction   
(HCC)   
[I26.99]2023  
Encounter Number:   
[id number]  
Encounter   
Status:Closed by   
ABHYANKAR, CHAKA on   
24             Normal                                  [facility]  
   
                                                    CBC W Auto Differential pane  
l (Bld)on 2023   
   
                                                    Basophils (Bld)   
[#/Vol]         0.05 10*3/uL    Normal          <0.11           [facility]  
   
                                        Comment on above:   Order Comment: Speci  
men Type: BLOOD SPECIMEN  
Ordering Facility: [facility]  
Address: [address] [place] [address]   
   
                                                            Performed By: #### 5  
7021-8 ####  
[facility]  
CLIA [id number]  
[address] [place] [address]   
   
                                                    Basophils/100 WBC   
(Bld)           0.7 %           Normal                          [facility]  
   
                                        Comment on above:   Order Comment: Speci  
men Type: BLOOD SPECIMEN  
Ordering Facility: [facility]  
Address: [address] [place] [address]   
   
                                                            Performed By: #### 5  
7021-8 ####  
[facility]  
CLIA [id number]  
[address] [place] [address]   
   
                                                    Differential cell   
count method Nom   
(Bld)           Auto            Normal                          [facility]  
   
                                        Comment on above:   Order Comment: Speci  
men Type: BLOOD SPECIMEN  
Ordering Facility: [facility]  
Address: [address] [place] [address]   
   
                                                            Performed By: #### 5  
7021-8 ####  
[facility]  
CLIA [id number]  
[address] [place] [address]   
   
                                                    Eosinophils (Bld)   
[#/Vol]         0.20 10*3/uL    Normal          <0.46           [facility]  
   
                                        Comment on above:   Order Comment: Speci  
men Type: BLOOD SPECIMEN  
Ordering Facility: [facility]  
Address: [address] [place] [address]   
   
                                                            Performed By: #### 5  
7021-8 ####  
[facility]  
CLIA [id number]  
[address] [place] [address]   
   
                                                    Eosinophils/100 WBC   
(Bld)           2.7 %           Normal                          [facility]  
   
                                        Comment on above:   Order Comment: Speci  
men Type: BLOOD SPECIMEN  
Ordering Facility: [facility]  
Address: [address] [place] [address]   
   
                                                            Performed By: #### 5  
7021-8 ####  
[place] [facility]  
CLIA [id number]  
[address] [place] [address]   
   
                                                    Erythrocyte   
distribution width   
(RBC) [Ratio]   13.0 %          Normal          11.5-15.0       [facility]  
   
                                        Comment on above:   Order Comment: Speci  
men Type: BLOOD SPECIMEN  
Ordering Facility: [facility]  
Address:  [place] [address]   
   
                                                            Performed By: #### 5  
7021-8 ####  
[facility]  
CLIA [id number]  
[address] [place] [address]   
   
                                                    Hematocrit (Bld)   
[Volume fraction] 44.9 %          Normal          39.0-51.0       [facility]  
   
                                        Comment on above:   Order Comment: Speci  
men Type: BLOOD SPECIMEN  
Ordering Facility: [facility]  
Address:  [place] [address]   
   
                                                            Performed By: #### 5  
7021-8 ####  
[facility]  
CLIA [id number]  
[address] [place] [address]   
   
                                                    Hemoglobin (Bld)   
[Mass/Vol]      14.3 g/dL       Normal          13.0-17.0       [facility]  
   
                                        Comment on above:   Order Comment: Speci  
men Type: BLOOD SPECIMEN  
Ordering Facility: [facility]  
Address:  [place] [address]   
   
                                                            Performed By: #### 5  
7021-8 ####  
[facility]  
CLIA [id number]  
[address] [place] [address]   
   
                                                    Immature granulocytes   
(Bld) [#/Vol]   0.03 10*3/uL    Normal          <0.10           [facility]  
   
                                        Comment on above:   Order Comment: Speci  
men Type: BLOOD SPECIMEN  
Ordering Facility: [facility]  
Address:  [place] [address]   
   
                                                            Performed By: #### 5  
7021-8 ####  
[facility]  
CLIA [id number]  
[address] [place] [address]   
   
                                                    Immature   
granulocytes/100 WBC   
(Bld)           0.4 %           Normal                          [facility]  
   
                                        Comment on above:   Order Comment: Speci  
men Type: BLOOD SPECIMEN  
Ordering Facility: [facility]  
Address: [address] [place] [address]   
   
                                                            Performed By: #### 5  
7021-8 ####  
[facility]  
CLIA [id number]  
[address] [place] [address]   
   
                                                    Lymphocytes (Bld)   
[#/Vol]         1.73 10*3/uL    Normal          1.00-4.00       [facility]  
   
                                        Comment on above:   Order Comment: Speci  
men Type: BLOOD SPECIMEN  
Ordering Facility: [facility]  
Address: [address] [place] [address]   
   
                                                            Performed By: #### 5  
7021-8 ####  
[facility]  
CLIA [id number]  
[address] [place] [address]   
   
                                                    Lymphocytes/100 WBC   
(Bld)           23.3 %          Normal                          [facility]  
   
                                        Comment on above:   Order Comment: Speci  
men Type: BLOOD SPECIMEN  
Ordering Facility: [facility]  
Address:  [place] [address]   
   
                                                            Performed By: #### 5  
7021-8 ####  
[facility]  
CLIA [id number]  
[address] [place] [address]   
   
                                                    MCH (RBC) [Entitic   
mass]           28.4 pg         Normal          26.0-34.0       [facility]  
   
                                        Comment on above:   Order Comment: Speci  
men Type: BLOOD SPECIMEN  
Ordering Facility: [facility]  
Address:  [place] [address]   
   
                                                            Performed By: #### 5  
7021-8 ####  
[facility]  
CLIA [id number]  
[address] [place] [address]   
   
                      MCHC (RBC) [Mass/Vol] 31.8 g/dL  Normal     30.5-36.0  [facility]  
   
                                        Comment on above:   Order Comment: Speci  
men Type: BLOOD SPECIMEN  
Ordering Facility: [facility]  
Address:  [place] [address]   
   
                                                            Performed By: #### 5  
7021-8 ####  
[facility]  
CLIA [id number]  
[address] [place] [address]   
   
                                                    MCV (RBC) [Entitic   
vol]            89.1 fL         Normal          80.0-100.0      [facility]  
   
                                        Comment on above:   Order Comment: Speci  
men Type: BLOOD SPECIMEN  
Ordering Facility: [facility]  
Address:  [place] [address]   
   
                                                            Performed By: #### 5  
7021-8 ####  
[facility]  
CLIA [id number]  
[address] [place] [address]   
   
                                                    Monocytes (Bld)   
[#/Vol]         0.63 10*3/uL    Normal          <0.87           [facility]  
   
                                        Comment on above:   Order Comment: Speci  
men Type: BLOOD SPECIMEN  
Ordering Facility: [facility]  
Address:  [place] [address]   
   
                                                            Performed By: #### 5  
7021-8 ####  
[facility]  
CLIA [id number]  
[address] [place] [address]   
   
                                                    Monocytes/100 WBC   
(Bld)           8.5 %           Normal                          [facility]  
   
                                        Comment on above:   Order Comment: Speci  
men Type: BLOOD SPECIMEN  
Ordering Facility: [facility]  
Address:  [place] [address]   
   
                                                            Performed By: #### 5  
7021-8 ####  
[facility]  
CLIA [id number]  
[address] [place] [address]   
   
                                                    Neutrophils (Bld)   
[#/Vol]         4.79 10*3/uL    Normal          1.45-7.50       [facility]  
   
                                        Comment on above:   Order Comment: Speci  
men Type: BLOOD SPECIMEN  
Ordering Facility: [facility]  
Address:  [place] [address]   
   
                                                            Performed By: #### 5  
7021-8 ####  
[facility]  
CLIA [id number]  
[address] [place] [address]   
   
                                                    Neutrophils/100 WBC   
(Bld)           64.4 %          Normal                          [facility]  
   
                                        Comment on above:   Order Comment: Speci  
men Type: BLOOD SPECIMEN  
Ordering Facility: [facility]  
Address:  [place] [address]   
   
                                                            Performed By: #### 5  
7021-8 ####  
[facility]  
CLIA [id number]  
[address] [place] [address]   
   
                                                    Nucleated RBC (Bld)   
[#/Vol]         10*3/uL         Normal          <0.01           [facility]  
   
                                        Comment on above:   Order Comment: Speci  
men Type: BLOOD SPECIMEN  
Ordering Facility: [facility]  
Address:  [place] [address]   
   
                                                            Performed By: #### 5  
7021-8 ####  
[facility]  
CLIA [id number]  
[address] [place] [address]   
   
                                                    Nucleated RBC/100 WBC   
(Bld) [Ratio]   0.0 /100 WBC    Normal                          [facility]  
   
                                        Comment on above:   Order Comment: Speci  
men Type: BLOOD SPECIMEN  
Ordering Facility: [facility]  
Address:  [place] [address]   
   
                                                            Performed By: #### 5  
7021-8 ####  
[facility]  
CLIA [id number]  
[address] [place] [address]   
   
                                                    Platelet mean volume   
(Bld) [Entitic vol] 10.4 fL         Normal          9.0-12.7        [facility]  
   
                                        Comment on above:   Order Comment: Speci  
men Type: BLOOD SPECIMEN  
Ordering Facility: [facility]  
Address:  [place] [address]   
   
                                                            Performed By: #### 5  
7021-8 ####  
[facility]  
CLIA [id number]  
[address] [place] [address]   
   
                                                    Platelets (Bld)   
[#/Vol]         277 10*3/uL     Normal          150-400         [facility]  
   
                                        Comment on above:   Order Comment: Speci  
men Type: BLOOD SPECIMEN  
Ordering Facility: [facility]  
Address:  [place] [address]   
   
                                                            Performed By: #### 5  
7021-8 ####  
[facility]  
CLIA [id number]  
[address] [place] [address]   
   
                      RBC (Bld) [#/Vol] 5.04 10*6/uL Normal     4.20-6.00  [facility]  
   
                                        Comment on above:   Order Comment: Speci  
men Type: BLOOD SPECIMEN  
Ordering Facility: [facility]  
Address:  [place] [address]   
   
                                                            Performed By: #### 5  
7021-8 ####  
[facility]  
CLIA [id number]  
[address] [place] [address]   
   
                      WBC (Bld) [#/Vol] 7.43 10*3/uL Normal     3.70-11.00 [facility]  
   
                                        Comment on above:   Order Comment: Speci  
men Type: BLOOD SPECIMEN  
Ordering Facility: [facility]  
Address:  [place] [address]   
   
                                                            Performed By: #### 5  
7021-8 ####  
[facility]  
CLIA [id number]  
[address] [place] [address]   
   
                                                    Comprehensive metabolic 2000  
 panelon 2023   
   
                      Albumin [Mass/Vol] 4.8 g/dL   Normal     3.9-4.9    [facility]  
   
                                        Comment on above:   Order Comment: Speci  
men Type: BLOOD SPECIMEN  
Ordering Facility: [facility]  
Address:  [place] [address]   
   
                                                            Performed By: #### 2  
4323-8 ####  
[facility]  
CLIA [id number]  
[address] [place] [address]   
   
                                                    ALP [Catalytic   
activity/Vol]   86 U/L          Normal                    [facility]  
   
                                        Comment on above:   Order Comment: Speci  
men Type: BLOOD SPECIMEN  
Ordering Facility: [facility]  
Address:  [place] [address]   
   
                                                            Performed By: #### 2  
4323-8 ####  
[facility]  
CLIA [id number]  
[address] [place] [address]   
   
                                                    ALT [Catalytic   
activity/Vol]   19 U/L          Normal          10-54           [facility]  
   
                                        Comment on above:   Order Comment: Speci  
men Type: BLOOD SPECIMEN  
Ordering Facility: [facility]  
Address:  [place] [address]   
   
                                                            Performed By: #### 2  
4323-8 ####  
[facility]  
CLIA [id number]  
[address] [place] [address]   
   
                      Anion gap [Moles/Vol] 7 mmol/L   Low        9-18       [facility]  
   
                                        Comment on above:   Order Comment: Speci  
men Type: BLOOD SPECIMEN  
Ordering Facility: [facility]  
Address:  [place] [address]   
   
                                                            Performed By: #### 2  
4323-8 ####  
[facility]  
CLIA [id number]  
[address] [place] [address]   
   
                                                    AST [Catalytic   
activity/Vol]   28 U/L          Normal          14-40           [facility]  
   
                                        Comment on above:   Order Comment: Speci  
men Type: BLOOD SPECIMEN  
Ordering Facility: [facility]  
Address:  [place] [address]   
   
                                                            Performed By: #### 2  
4323-8 ####  
[facility]  
CLIA [id number]  
[address] [place] [address]   
   
                      Bilirubin [Mass/Vol] 0.4 mg/dL  Normal     0.2-1.3    [facility]  
   
                                        Comment on above:   Order Comment: Speci  
men Type: BLOOD SPECIMEN  
Ordering Facility: [facility]  
Address:  [place] [address]   
   
                                                            Performed By: #### 2  
4323-8 ####  
[facility]  
CLIA [id number]  
[address] [place] [address]   
   
                      Calcium [Mass/Vol] 9.8 mg/dL  Normal     8.5-10.2   [facility]  
   
                                        Comment on above:   Order Comment: Speci  
men Type: BLOOD SPECIMEN  
Ordering Facility: [facility]  
Address:  [place] [address]   
   
                                                            Performed By: #### 2  
4323-8 ####  
[facility]  
CLIA [id number]  
[address] [place] [address]   
   
                      Chloride [Moles/Vol] 106 mmol/L High            [facility]  
   
                                        Comment on above:   Order Comment: Speci  
men Type: BLOOD SPECIMEN  
Ordering Facility: [facility]  
Address:  [place] [address]   
   
                                                            Performed By: #### 2  
4323-8 ####  
[facility]  
CLIA [id number]  
[address] [place] [address]   
   
                      CO2 [Moles/Vol] 26 mmol/L  Normal     22-30      [facility]  
   
                                        Comment on above:   Order Comment: Speci  
men Type: BLOOD SPECIMEN  
Ordering Facility: [facility]  
Address: [address] [place] [address]   
   
                                                            Performed By: #### 2  
4323-8 ####  
[facility]  
CLIA [id number]  
[address] [place] [address]   
   
                      Creatinine [Mass/Vol] 0.94 mg/dL Normal     0.73-1.22  [facility]  
   
                                        Comment on above:   Order Comment: Speci  
men Type: BLOOD SPECIMEN  
Ordering Facility: [facility]  
Address: [address] [place] [address]   
   
                                                            Performed By: #### 2  
4323-8 ####  
[facility]  
CLIA [id number]  
[address] [place] [address]   
   
                                                    Creatinine and   
Glomerular filtration   
rate.predicted panel   
(S/P/Bld)       96 mL/min/1.73m??? Normal          >=60            [facility]  
   
                                        Comment on above:   Order Comment: Speci  
men Type: BLOOD SPECIMEN  
Ordering Facility: [facility]  
Address: [address] [place] [address]   
   
                                                            Result Comment: Teresa  
mated Glomerular Filtration Rate (eGFR) is   
calculated using the  CKD-EPI creatinine equation. This   
equation utilizes serum creatinine, sex, and age as parameters.   
The creatinine assay has traceable calibration to isotope   
dilution-mass spectrometry. Refer to KDIGO guidelines for   
clinical interpretation. In patients with unstable renal   
function, e.g. those with acute kidney injury, the eGFR may not   
accurately reflect actual GFR.   
   
                                                            Performed By: #### 2  
4323-8 ####  
[facility]  
CLIA [id number]  
[address] [place] [address]   
   
                      Glucose [Mass/Vol] 138 mg/dL  High       74-99      [facility]  
   
                                        Comment on above:   Order Comment: Speci  
men Type: BLOOD SPECIMEN  
Ordering Facility: [facility]  
Address: [address] [place] [address]   
   
                                                            Result Comment: The   
American Diabetes Association (ADA) provides   
guidance for cutoff values for fasting glucose and random   
glucose. The ADA defines fasting as no caloric intake for at   
least 8 hours. Fasting plasma glucose results between 100 to 125   
mg/dL indicate increased risk for diabetes (prediabetes).  
Fasting plasma glucose results greater than or equal to 126   
mg/dL meet the criteria for diagnosis of diabetes. In the   
absence of unequivocal hyperglycemia, results should be   
confirmed by repeat testing. In a patient with classic symptoms   
of hyperglycemia or hyperglycemic crisis, random plasma glucose   
results greater than or equal to 200 mg/dL meet the criteria for   
diagnosis of diabetes.  
Reference: Standards of Medical Care in Diabetes 2016, American   
Diabetes Association. Diabetes Care. 2016.39(Suppl 1).   
   
                                                            Performed By: #### 2  
4323-8 ####  
[facility]  
CLIA [id number]  
[address] [place] [address]   
   
                      Potassium [Moles/Vol] 4.4 mmol/L Normal     3.7-5.1    [facility]  
   
                                        Comment on above:   Order Comment: Speci  
men Type: BLOOD SPECIMEN  
Ordering Facility: [facility]  
Address: [address] [place] [address]   
   
                                                            Performed By: #### 2  
4323-8 ####  
[facility]  
CLIA [id number]  
[address] [place] [address]   
   
                      Protein [Mass/Vol] 7.5 g/dL   Normal     6.3-8.0    [facility]  
   
                                        Comment on above:   Order Comment: Speci  
men Type: BLOOD SPECIMEN  
Ordering Facility: [facility]  
Address: [address] [place] [address]   
   
                                                            Performed By: #### 2  
4323-8 ####  
[facility]  
CLIA [id number]  
[address] [place] [address]   
   
                      Sodium [Moles/Vol] 139 mmol/L Normal     136-144    [facility]  
   
                                        Comment on above:   Order Comment: Speci  
men Type: BLOOD SPECIMEN  
Ordering Facility: [facility]  
Address: [address] [place] [address]   
   
                                                            Performed By: #### 2  
4323-8 ####  
[facility]  
CLIA [id number]  
[address] [place] [address]   
   
                                                    Urea nitrogen   
[Mass/Vol]      23 mg/dL        Normal          9-24            [facility]  
   
                                        Comment on above:   Order Comment: Speci  
men Type: BLOOD SPECIMEN  
Ordering Facility: [facility]  
Address: [facility] [address]   
   
                                                            Performed By: #### 2  
4323-8 ####  
[facility]  
CLIA [id number]  
[address] [place] [address]   
   
                                                    BNPon 2021   
   
                                                    Natriuretic peptide B   
(Bld) [Mass/Vol] 1004.0 pg/mL    Critically high <=900.0         [facility]  
   
                                        Comment on above:   Performed By: #### C  
VDRPD ####  
[facility]  
[address] [place] [address]  
Khurram Arlene   
   
                                                    CBC AUTO DIFFon 2021   
   
                      BASO #     0.0 103/ul Normal     0.0-0.1    [facility]  
   
                                        Comment on above:   Performed By: #### D  
DIM ####  
[facility]  
[address] [place] [address]  
Khurram Arlene   
   
                                                    Basophils/100 WBC   
(Bld)           0.5 %           Normal          0.2-2.0         [facility]  
   
                                        Comment on above:   Performed By: #### D  
DIM ####  
[facility]  
[address] [place] [address]  
Khurram Arlene   
   
                      EO #       0.0 103/ul Normal     0.0-0.7    [facility]  
   
                                        Comment on above:   Performed By: #### D  
DIM ####  
[facility]  
[address] [place] [address]  
Khurram Arlene   
   
                                                    Eosinophils/100 WBC   
(Bld)           0.5 %           Critically low  0.9-7.0         [facility]  
   
                                        Comment on above:   Performed By: #### D  
DIM ####  
[facility]  
[address] [place] [address]  
Khurram Arlene   
   
                                                    Erythrocyte   
distribution width   
(RBC) [Ratio]   16.9 %          Critically high 11.0-15.0       [facility]  
   
                                        Comment on above:   Performed By: #### D  
DIM ####  
[facility]  
[address] [place] [address]  
Khurram Arlene   
   
                                                    Hematocrit (Bld)   
[Volume fraction] 32.8 %          Critically low  42.0-54.0       [facility]  
   
                                        Comment on above:   Performed By: #### D  
DIM ####  
[facility]  
[address] [place], [place] [address]  
Khurram Arlene   
   
                                                    Hemoglobin (Bld)   
[Mass/Vol]      8.9 g/dL        Critically low  14.0-18.0       [facility]  
   
                                        Comment on above:   Performed By: #### D  
DIM ####  
[facility]  
[address] [id number]  
Khurram Arlene   
   
                      IG #       0.05 10e3/ul Critically high 0.00-0.03  [facility]  
   
                                        Comment on above:   Performed By: #### D  
DIM ####  
[facility]  
[address] [id number]  
Khurram Arlene   
   
                      IG %       0.7 %      Critically high 0.0-0.5    [facility]  
   
                                        Comment on above:   Performed By: #### D  
DIM ####  
[facility]  
[address] [id number]  
Khurram Arlene   
   
                      LYMPH #    1.0 103/ul Critically low 1.2-3.8    [facility]  
   
                                        Comment on above:   Performed By: #### D  
DIM ####  
[facility]  
[address] [id number]  
Khurram Jacobs   
   
                                                    Lymphocytes/100 WBC   
(Bld)           13.0 %          Critically low  20.5-60.0       [facility]  
   
                                        Comment on above:   Performed By: #### D  
DIM ####  
[facility]  
[address] [id number]  
Khurram Jacobs   
   
                      MANUAL DIFF REQ NO         Normal                [facility]  
   
                                        Comment on above:   Performed By: #### D  
DIM ####  
[facility]  
[address], [place] [address]  
Khurram Jacobs   
   
                                                    MCH (RBC) [Entitic   
mass]           18.6 pg         Critically low  25.9-34.0       [facility]  
   
                                        Comment on above:   Performed By: #### D  
DIM ####  
[facility]  
[address] [id number]  
Khurram Jacobs   
   
                      MCHC (RBC) [Mass/Vol] 27.1 g/dL  Critically low 29.9-35.2   
 [facility]  
   
                                        Comment on above:   Performed By: #### D  
DIM ####  
[facility]  
[address], [place] [address]  
Khurram Arlene   
   
                                                    MCV (RBC) [Entitic   
vol]            68.6 fL         Critically low  80.0-94.0       [facility]  
   
                                        Comment on above:   Performed By: #### D  
DIM ####  
[facility]  
[address], [place] [address]  
Khurram Arlene   
   
                      MONO #     0.8 103/ul Normal     0.3-0.8    [facility]  
   
                                        Comment on above:   Performed By: #### D  
DIM ####  
[facility]  
[address] [place], [place] [address]  
Khurram Arlene   
   
                                                    Monocytes/100 WBC   
(Bld)           10.5 %          Normal          1.7-12.0        [facility]  
   
                                        Comment on above:   Performed By: #### D  
DIM ####  
[facility]  
[address] [place] [address]  
Khurram Arlene   
   
                      NEUT #     5.7 103/ul Normal     1.4-6.5    [facility]  
   
                                        Comment on above:   Performed By: #### D  
DIM ####  
[facility]  
[address] [place], [place] [address]  
Khurram Arlene   
   
                                                    Neutrophils/100 WBC   
(Bld)           74.8 %          Normal          43.0-75.0       [facility]  
   
                                        Comment on above:   Performed By: #### D  
DIM ####  
[facility]  
[address] [place], [place] [address]  
Khurramlona Jacobs   
   
                                                    Platelet mean volume   
(Bld) [Entitic vol] 9.9 fL          Normal          9.5-13.5        [facility]  
   
                                        Comment on above:   Performed By: #### D  
DIM ####  
[facility]  
[address] [place], [place] [address]  
Khurram Arlene   
   
                      PLT        226 103/ul Normal     150-450    [facility]  
   
                                        Comment on above:   Performed By: #### D  
DIM ####  
[facility]  
[address] [place], [place] [address]  
Khurram Arlene   
   
                      RBC        4.78 106/ul Normal     4.70-6.10  [facility]  
   
                                        Comment on above:   Performed By: #### D  
DIM ####  
[facility]  
[address] [place], [place] [id number]  
Khurram Arlene   
   
                      WBC        7.6 103/ul Normal     4.0-11.0   [facility]  
   
                                        Comment on above:   Performed By: #### D  
DIM ####  
[facility]  
[address], [place] [address]  
Khurram Jacobs   
   
                                                    CT ABD/PELV W CONon 20   
   
                                        CT ABD/PELV W CON   EXAM:CT ABD/PELV W   
CON  
HISTORY: SHORTNESS   
OF BREATH bleeding   
ulcer  
TECHNIQUE: CT   
abdomen and pelvis.   
Helically acquired   
axial images of the   
abdomen  
and pelvis from the   
diaphragm to the   
iliac crest and the   
iliac crest to the  
symphysis pubis.   
Sagittal and coronal   
multiplanar   
reconstructions. The  
examination was   
performed 100 cc   
Omnipaque 350 IV. No   
oral contrast.  
Dose reduction   
techniques were   
achieved by using   
automated exposure   
control  
and/or adjustment of   
mA and/or kV   
according to patient   
size and/or use of  
iterative   
reconstruction   
technique.  
COMPARISON: No   
comparison  
FINDINGS:  
LUNG BASES/LOWER   
CHEST: Normal.  
LIVER: No definite   
abnormality of the   
liver is identified.   
No intrahepatic  
biliary dilatation.  
BILIARY   
TREE/GALLBLADDER:   
There is no CT   
evidence of acute   
cholecystitis. No  
evidence of   
intrahepatic biliary   
dilatation.  
SPLEEN: Normal  
PANCREAS:There is no   
definite   
abnormality.  
ADRENAL GLANDS: No   
evidence of an   
adrenal lesion.  
KIDNEYS, URETERS AND   
URINARY BLADDER: The   
right kidney is   
unremarkable. No  
hydronephrosis.   
There is marked   
hydronephrosis on   
the left with marked   
cortical  
thinning with marked   
blunting and   
dilatation of the   
calyces infundibula   
and  
renal pelvis with   
the renal pelvis   
measuring 6.7 cm.   
findings consistent   
with  
chronic UPJ   
obstruction on the   
left. Multiple   
cortical cysts on   
the left. The  
bladder is   
unremarkable.  
LYMPH NODES: No   
evidence of   
significantly   
enlarged lymph   
nodes.  
VESSELS:Aorta   
normal. No aneurysm.  
GI Tract: Moderate   
sigmoid and   
scattered colonic   
colonic   
diverticulosis   
without  
diverticulitis. No   
dilatation to   
suggest obstruction.   
The appendix is   
normal.  
Moderate size   
retrocardiac hiatal   
hernia. Pericardial   
cyst right   
cardiophrenic  
angle on the right   
incidental measures   
5 cm.  
PERITONEUM: No free   
fluid.  
PELVIS AND   
REPRODUCTIVE ORGANS:   
No abnormality   
within the   
visualized  
reproductive organs.  
BONES: No acute   
findings  
ABDOMINAL WALL:   
Normal  
IMPRESSION: Chronic   
left UPJ obstruction   
with marked   
dilatation of the  
collecting system   
with very little   
remaining renal   
parenchyma   
consistent with  
chronic renal   
disease.  
Colonic   
diverticulosis   
without   
diverticulitis  
Hiatal hernia  
No acute findings  
Electronically   
authenticated by:   
ISSAC PARKINSON Date:   
2021 13:36    Normal                                  [facility]  
   
                                                    CTA CHEST WO W CONon   
021   
   
                                        CTA CHEST WO W CON  EXAM: CTA CHEST WO W  
   
CON  
HISTORY: Pain  
COMPARISON: None  
Technique: Axial CTA   
images were obtained   
through the chest   
with contrast.100 mL  
of Omnipaque   
350.Individualize   
dose optimization   
technique was used   
for the  
performed procedure   
by employing the   
following: Automated   
exposure control,  
adjustment of the mA   
and/or kV according   
to patient's size,   
and/or the use of  
the iterative   
construction   
technique.Postproces  
sing MIP image   
reformats were  
performed by the   
technologist on the   
CT scanner under   
supervision of a  
radiologist.  
FINDINGS: There are   
large bilateral   
pulmonary emboli   
with a saddle   
embolus  
extending into the   
upper and lower   
lobar pulmonary   
arteries   
bilaterally. There  
is evidence of right   
heart strain. The RV   
to LV ratio is   
measured at  
approximately 1.4.   
Mild vascular   
calcifications and   
degenerative changes   
of the  
spine are observed.   
There are mildly   
prominent   
mediastinal and   
hilar lymph  
nodes. The heart is   
enlarged. There is a   
small pericardial   
cyst at the right  
cardiophrenic angle.   
The mediastinum and   
great vessels are   
otherwise  
unremarkable as   
imaged. There are   
mild interstitial   
changes. No focal   
infiltrate  
or pleural effusion   
is seen. No discrete   
noncalcified   
parenchymal or  
pleural-based mass   
or nodule is   
identified. The   
visualized portions   
of the upper  
abdomen demonstrate   
a moderate size   
fixed hiatal hernia,   
mild fatty   
infiltration  
of the liver and   
renal cysts.  
IMPRESSION: Saddle   
embolus with large   
bilateral upper and   
lower lobar   
pulmonary  
emboli as well as   
evidence of right   
heart strain.  
Case sent to stat   
call folder.  
Electronically   
authenticated by:   
KARIN ALVARADO   
Date: 2021   
13:51               Normal                                  [facility]  
   
                                                    Covid-19 PCR (CVDTBH)on    
   
                                                    SARS-CoV-2 (COVID-19)   
RNA CHITO+probe Ql   
(Unsp spec)     Not detected    Normal          NOT DETECTED    [facility]  
   
                                        Comment on above:   Result Comment: This  
 test is not yet approved or cleared by   
the   
[place] FDA. When there are no FDA-approved or cleared   
tests available, and other criteria are met, FDA can make tests   
available under an emergency access mechanism called an   
Emergency Use Authorization (EUA). The EUA for this test is   
supported by the  of Health and Human Service's (HHS's)   
declaration that circumstances exist to justify the emergency   
use of in vitro diagnostics for the detection and/or diagnosis   
of the virus that causes COVID-19. This EUA will remain in   
effect (meaning this test can be used) for the duration of the   
COVID-19 declaration justifying emergency of IVDs, unless it is   
terminated or revoked by FDA (after which the test may no longer   
be used).  
When diagnostic testing is negative, the possibility of a false   
negative should be considered in  
the context of a patient's recent exposures and the presence of   
clinical signs and symptoms  
consistent with SARS-CoV-2.   
   
                                                            Performed By: #### C  
VDTBH, CVDAGS ####  
[facility]  
[address] [place] [address]  
Khurram Jacobs   
   
                                                    D-DIMERon 2021   
   
                      D-DIMER    7.99 mg/L FEU Critically high 0.19-0.50  [facility]  
   
                                        Comment on above:   Result Comment: test  
 repeated critical value verified   
   
                                                            Performed By: #### D  
DIM ####  
[facility]  
[address] [place] [address]  
Khurramlona Jacobs   
   
                      D-DIMER COMMENTS SEE BELOW  Normal                [facility]  
   
                                        Comment on above:   Result Comment: Incr  
eases in D-Dimer concentration observed   
with   
thromboembolic events can be variable due to localization, size,   
and age of the thrombus. Therefore, a thromboembolic event   
cannot be diagnosed with certainty on the basis of the reference   
range. D-Dimers may also be elevated for a variety of disorders   
including: advanced age, pregnancy, coronary disease, cancer,   
liver disease, infection, inflammation, hematoma, DIC, trauma,   
post-surgery, diabetes, thrombolytic or anticoagulant therapy,   
stress, and generalized hospitalization.   
   
                                                            Performed By: #### D  
DIM ####  
[facility]  
[address] [place], [place] [address]  
Khurram Jacobs   
   
                                                    PROF 14(COMP METB)on   
021   
   
                      Albumin [Mass/Vol] 4.0 g/dL   Normal     3.5-5.0    [facility]  
   
                                        Comment on above:   Performed By: #### D  
DIM ####  
[facility]  
[address] [place], [place] [address]  
Khurram Jacobs   
   
                                                    Albumin/Globulin   
[Mass ratio]    1.1 {ratio}     Normal                          [facility]  
   
                                        Comment on above:   Performed By: #### D  
DIM ####  
[facility]  
[address] [place], [place] [address]  
Khurram Arlene   
   
                                                    ALP [Catalytic   
activity/Vol]   75 U/L          Normal                    [facility]  
   
                                        Comment on above:   Performed By: #### D  
DIM ####  
[facility]  
[address] [place], [place] [address]  
Khurram Arlene   
   
                                                    ALT [Catalytic   
activity/Vol]   15 U/L          Critically low  21-72           [facility]  
   
                                        Comment on above:   Performed By: #### D  
DIM ####  
[facility]  
[address] [place], [place] [address]  
Khurram Arlene   
   
                      Anion gap [Moles/Vol] 14.7 mmol/L Normal                [facility]  
[facility]  
   
                                        Comment on above:   Performed By: #### D  
DIM ####  
[facility]  
[address] [place], [place] [address]  
Khurram Arlene   
   
                                                    AST [Catalytic   
activity/Vol]   20 U/L          Normal          17-59           [facility]  
   
                                        Comment on above:   Performed By: #### D  
DIM ####  
[facility]  
[address] [place], [place] [address]  
Khurram Arlene   
   
                      Bilirubin [Mass/Vol] 0.6 mg/dL  Normal     0.2-1.3    The   
[facility]  
   
                                        Comment on above:   Performed By: #### D  
DIM ####  
[facility]  
[address] [place], [place] [address]  
Khurram Arlene   
   
                      Calcium [Mass/Vol] 8.9 mg/dL  Normal     8.4-10.2   [facility]  
   
                                        Comment on above:   Performed By: #### D  
DIM ####  
[facility]  
[address] [place], [place] [address]  
Khurram Arlene   
   
                      Chloride [Moles/Vol] 107 mmol/L Normal          [facility]   
[facility]  
   
                                        Comment on above:   Performed By: #### D  
DIM ####  
[facility]  
[address] [place], [place] [address]  
Khurram Arlene   
   
                      CO2 [Moles/Vol] 25.8 mmol/L Normal     22.0-30.0  [facility]  
   
                                        Comment on above:   Performed By: #### D  
DIM ####  
[facility]  
[address] [place], [place] [address]  
Khurram Arlene   
   
                      Creatinine [Mass/Vol] 0.97 mg/dL Normal     0.66-1.25  [facility]  
   
                                        Comment on above:   Performed By: #### D  
DIM ####  
[facility]  
[address], [place] [address]  
Khurrma Arlene   
   
                      EGFR-AF AMERICAN >60        Normal     >=60       [facility]  
   
                                        Comment on above:   Performed By: #### D  
DIM ####  
[facility]  
[address], [place] [address]  
Khurram Arlene   
   
                      EGFR-NON AF AMERICAN >60        Normal     >=60       [facility]  
   
                                        Comment on above:   Performed By: #### D  
DIM ####  
[facility]  
[address] [id number]  
Khurram Arlene   
   
                                                    Globulin (S)   
[Mass/Vol]      3.5 g/dL        Normal                          [facility]  
   
                                        Comment on above:   Performed By: #### D  
DIM ####  
[facility]  
[address] [place] [address]  
Khurram Arlene   
   
                      Glucose [Mass/Vol] 111 mg/dL  Critically high      [facility]  
   
                                        Comment on above:   Performed By: #### D  
DIM ####  
[facility]  
[address] [place], [place] [address]  
Khruram Arlene   
   
                      Potassium [Moles/Vol] 4.5 mmol/L Normal     3.4-5.0    [facility]  
   
                                        Comment on above:   Performed By: #### D  
DIM ####  
[facility]  
[address] [place], [place] [address]  
Khurram Arlene   
   
                      Protein [Mass/Vol] 7.5 g/dL   Normal     6.1-8.2    [facility]  
   
                                        Comment on above:   Performed By: #### D  
DIM ####  
[facility]  
[address] [place], [place] [address]  
Khurram Arlene   
   
                      Sodium [Moles/Vol] 143 mmol/L Normal     137-145    [facility]  
   
                                        Comment on above:   Performed By: #### D  
DIM ####  
[facility]  
[address] [place], [place] [address]  
Khurram Arlene   
   
                                                    Urea nitrogen   
[Mass/Vol]      13.0 mg/dL      Normal          9.0-20.0        [facility]  
   
                                        Comment on above:   Performed By: #### D  
DIM ####  
[facility]  
[address] [place] [address]  
Khurram Jacobs   
   
                                                    Urea   
nitrogen/Creatinine   
[Mass ratio]    13.4 mg/mg      Normal                          [facility]  
   
                                        Comment on above:   Performed By: #### D  
DIM ####  
[facility]  
[address] [place], [place] [address]  
Khurram Jacobs   
   
                                                    PROTIMEon 2021   
   
                                                    INR Coag (PPP)   
[Relative time] 1.06 {INR}      Normal                          [facility]  
   
                                        Comment on above:   Performed By: #### D  
DIM ####  
[facility]  
[address] [place] [address]  
Khurram Jacobs   
   
                      INR GUIDELINES SEE BELOW  Normal                [facility]  
   
                                        Comment on above:   Result Comment: NANDO  
RED INR: 2.0 - 3.0 CONDITIONS NOT LISTED   
BELOW 2.5 - 3.5 FOR PROSTHETIC HEART VALVE REPLACEMENT 2.5 - 3.5   
RECURRENT THROMBOSIS   
   
                                                            Performed By: #### D  
DIM ####  
[facility]  
[address] [place] [address]  
Khurram Jacobs   
   
                      PT Coag (PPP) [Time] 11.4 s     Normal     9.0-11.6   The   
[facility]  
   
                                        Comment on above:   Performed By: #### D  
DIM ####  
[facility]  
[address] [place] [address]  
Khurram Jacobs   
   
                                                    PTTon 2021   
   
                                                    aPTT Coag (Bld)   
[Time]          24.7 s          Normal          22.3-36.2       [facility]  
   
                                        Comment on above:   Performed By: #### D  
DIM ####  
[facility]  
[address] [place] [address]  
Khurram Jacobs   
   
                                                    SYMPTOMATIC COVID-19 ANTIGEN  
on 2021   
   
                      EUA Statement SEE BELOW  Normal                [facility]  
   
                                        Comment on above:   Result Comment: This  
 test has not been FDA cleared or   
approved,   
but has been authorized by the FDA under an Emergency Use   
Authorization (EUA) for use by authorized laboratories certified   
under CLIA that meet the requirements to perform moderate or   
high complexity testing. This test has been authorized only for   
the detection of proteins from SARS-CoV-2, not for any other   
viruses or pathogens. The emergency use of this test is   
authorized for the duration of the declaration that   
circumstances exist justifying the authorization of emergency   
use of in vitro diagnostic tests for detection and/or diagnosis   
of Covid-19 under section 564(b)(1) of the Act, 21 U.S.C.   
360bbb-3(b)(1), unless the declaration is terminated or   
authorization is revoked sooner.   
   
                                                            Performed By: #### C  
VDTBH, CVDAGS ####  
[facility]  
[address] [address]  
Khurram Jacobs   
   
                                                    SARS-CoV-2 (COVID-19)   
RNA CHITO+probe Ql   
(Unsp spec)     Negative        Normal          NEGATIVE        [facility]  
   
                                        Comment on above:   Result Comment: CONF  
IRMATION BY PCR PENDING PER CDC   
GUIDELINES/   
SYMPTOMATIC PATIENT.   
   
                                                            Performed By: #### C  
VDTBH, CVDAGS ####  
[facility]  
[address] [place] [address]  
Khurram Jacobs   
   
                                                    TROPONIN, HIGH SENSITIVITYon  
 2021   
   
                      HSTROP     455.4 pg/mL Critically high 4.0-42.2   [facility]  
   
                                        Comment on above:   Result Comment: CUT-  
OFF POINTS HAVE BEEN ESTABLISHED BASED ON   
THE FOURTH UNIVERSAL DEFINITIONS OF MYOCARDIAL  
INFARCTION. THE UPPER REFERENCE LIMIT (URL) OF TROPONIN, DEFINED   
AS THE 99TH PERCENTILE OF  
cTnI DISTRIBUTION IN A REFERENCE POPULATION, HAS BEEN CONFIRMED   
AS THE DECISION THRESHOLD  
FOR MI DIAGNOSIS.  
test repeated critical value verified   
   
                                                            Performed By: #### C  
VDRPD ####  
[facility]  
[address] [place] [address]  
Khurram Jacobs   
   
                                                    XR CHEST 1 Von 2021   
   
                                        XR CHEST 1 V        EXAM: XR CHEST 1 V  
HISTORY: Shortness   
of Breath  
COMPARISON: 2021  
FINDINGS: The heart,   
mediastinum,   
pulmonary   
vasculature, and   
bony thorax  
demonstrate no   
discrete acute   
abnormality. There   
is no evidence of an  
infiltrate, pleural   
effusion or   
pneumothorax. There   
is mild prominence   
of the  
lung markings.  
IMPRESSION: No   
evidence of an acute   
cardiopulmonary   
abnormality.  
Electronically   
authenticated by:   
KARIN ALVARADO   
Date: 2021   
13:21               Normal                                  [facility]  
   
                                                    COVID-19 FRMCon 07-   
   
                                                    SARS-CoV-2 (COVID-19)   
RNA CHITO+probe Ql   
(Unsp spec)     Negative        Normal          Negative        [facility]  
   
                                        Comment on above:   Order Comment: Healt  
hcare Worker?: N   
   
                                                            Result Comment:  
Testing for SARS-CoV-2 by RT-PCR  
This test was developed and its performance characteristics  
determined by [employer]min, [employer] and validated  
at the [facility]. This test has not  
been FDA cleared or approved. This test has been authorized  
by FDA under an Emergency Use Authorization (EUA). This test  
has been validated in accordance with the FDA's Guidance  
Document (Policy for Diagnostics Testing in Laboratories  
Certified to Perform High Complexity Testing under CLIA  
prior to Emergency Use Authorization for Coronavirus  
Disease-2019 during the Public Health Emergency) issued on  
2020. This test is only authorized for the  
duration of time the declaration that circumstances exist  
justifying the authorization of the emergency use of in  
vitro diagnostic tests for detection of SARS-CoV-2 virus  
and/or diagnosis of COVID-19 infection under section  
564(b)(1) of the Act, 21 U.S.C. 360bbb-3(b)(1), unless the  
authorization is terminated or revoked sooner.  
PERFORMED BY:  
[place] [id number]  
[phone]  
PATHOLOGIST MEDICAL DIRECTOR  
ADE KENNY M.D.   
   
                                                            Performed By: #### S  
OFCHUYITAEG, COVID-19 CARLOS ENRIQUE ####  
[address]   
   
                                                    CBC AUTO DIFFon 2021   
   
                      BASO #     0.1 103/ul Normal     0.0-0.1    [facility]  
   
                                        Comment on above:   Performed By: #### D  
DIM ####  
[facility]  
[address] [place] [address]  
Khurram Arlene   
   
                                                    Basophils/100 WBC   
(Bld)           0.8 %           Normal          0.2-2.0         [facility]  
   
                                        Comment on above:   Performed By: #### D  
DIM ####  
[facility]  
[address] [place], [place] [address]  
Khurram Arlene   
   
                      EO #       0.1 103/ul Normal     0.0-0.7    [facility]  
   
                                        Comment on above:   Performed By: #### D  
DIM ####  
[facility]  
[address] [place] [address]  
Khurram Arlene   
   
                                                    Eosinophils/100 WBC   
(Bld)           1.2 %           Normal          0.9-7.0         [facility]  
   
                                        Comment on above:   Performed By: #### D  
DIM ####  
[facility]  
[address] [place] [address]  
Khurram Jacobs   
   
                                                    Erythrocyte   
distribution width   
(RBC) [Ratio]   19.6 %          Critically high 11.0-15.0       [facility]  
   
                                        Comment on above:   Performed By: #### D  
DIM ####  
[facility]  
[address] [place] [address]  
Khurram Jacobs   
   
                                                    Hematocrit (Bld)   
[Volume fraction] 31.4 %          Critically low  42.0-54.0       [facility]  
   
                                        Comment on above:   Performed By: #### D  
DIM ####  
[facility]  
[address] [place] [address]  
Khurram Jacobs   
   
                                                    Hemoglobin (Bld)   
[Mass/Vol]      8.7 g/dL        Critically low  14.0-18.0       [facility]  
   
                                        Comment on above:   Performed By: #### D  
DIM ####  
[facility]  
[address] [place] [address]  
Khurramlona Jacobs   
   
                      IG #       0.01 10e3/ul Normal     0.00-0.03  [facility]  
   
                                        Comment on above:   Performed By: #### D  
DIM ####  
[facility]  
[address] [place] [address]  
Khurramlona Jacobs   
   
                      IG %       0.2 %      Normal     0.0-0.5    [facility]  
   
                                        Comment on above:   Performed By: #### D  
DIM ####  
[facility]  
[address] [place] [address]  
Khurram Arlene   
   
                      LYMPH #    1.3 103/ul Normal     1.2-3.8    [facility]  
   
                                        Comment on above:   Performed By: #### D  
DIM ####  
[facility]  
[address] [place] [address]  
Khurram Jacobs   
   
                                                    Lymphocytes/100 WBC   
(Bld)           22.4 %          Normal          20.5-60.0       [facility]  
   
                                        Comment on above:   Performed By: #### D  
DIM ####  
[facility]  
[address] [place] [address]  
Khurram Jacobs   
   
                      MANUAL DIFF REQ NO         Normal                [facility]  
   
                                        Comment on above:   Performed By: #### D  
DIM ####  
[facility]  
[address] [place], [place] [address]  
Khurramlona Jacobs   
   
                                                    MCH (RBC) [Entitic   
mass]           19.3 pg         Critically low  25.9-34.0       [facility]  
   
                                        Comment on above:   Performed By: #### D  
DIM ####  
[facility]  
[address] [place], [place] [address]  
Khurramlona Jacobs   
   
                      MCHC (RBC) [Mass/Vol] 27.7 g/dL  Critically low 29.9-35.2   
 [facility]  
   
                                        Comment on above:   Performed By: #### D  
DIM ####  
[facility]  
[address] [id number]  
Khurramlona Jacobs   
   
                                                    MCV (RBC) [Entitic   
vol]            69.8 fL         Critically low  80.0-94.0       [facility]  
   
                                        Comment on above:   Performed By: #### D  
DIM ####  
[facility]  
[address] [place], [place] [address]  
Khurramlona Mendezen   
   
                      MONO #     0.7 103/ul Normal     0.3-0.8    [facility]  
   
                                        Comment on above:   Performed By: #### D  
DIM ####  
[facility]  
[address] [place], [place] [address]  
Khurram Arlene   
   
                                                    Monocytes/100 WBC   
(Bld)           11.4 %          Normal          1.7-12.0        [facility]  
   
                                        Comment on above:   Performed By: #### D  
DIM ####  
[facility]  
[address] [place], [place] [address]  
Khurramlona Mendezen   
   
                      NEUT #     3.8 103/ul Normal     1.4-6.5    [facility]  
   
                                        Comment on above:   Performed By: #### D  
DIM ####  
[facility]  
[address] [place], [place] [address]  
Khurram Arlene   
   
                                                    Neutrophils/100 WBC   
(Bld)           64.0 %          Normal          43.0-75.0       [facility]  
   
                                        Comment on above:   Performed By: #### D  
DIM ####  
[facility]  
[address] [place], [place] [address]  
Khurram Arlene   
   
                                                    Platelet mean volume   
(Bld) [Entitic vol] 9.6 fL          Normal          9.5-13.5        [facility]  
   
                                        Comment on above:   Performed By: #### D  
DIM ####  
[facility]  
[address], [place] [address]  
Khurramlona Mendezen   
   
                      PLT        311 103/ul Normal     150-450    [facility]  
   
                                        Comment on above:   Performed By: #### D  
DIM ####  
[facility]  
[address], [place] [address]  
Khurram Mendezen   
   
                      RBC        4.50 106/ul Critically low 4.70-6.10  [facility]  
   
                                        Comment on above:   Performed By: #### D  
DIM ####  
[facility]  
[address] [place], [place] [address]  
Khurramlona Mendezen   
   
                      WBC        5.9 103/ul Normal     4.0-11.0   [facility]  
   
                                        Comment on above:   Performed By: #### D  
DIM ####  
[facility]  
[address] [place] [address]  
Khurram Jacobs   
   
                                                    FERRITINon 2021   
   
                      Ferritin [Mass/Vol] 7.0 ng/mL  Critically low 17.9-464.0 [facility]  
   
                                        Comment on above:   Performed By: #### F  
ERR, FETIBC, B12FOL ####  
[facility]  
[address] [id number]  
Khurram Jacobs   
   
                                                    IRON AND TIBCon 2021   
   
                      % SATURATION 3.6 %      Normal                [facility]  
   
                                        Comment on above:   Performed By: #### F  
ERR, FETIBC, B12FOL ####  
[facility]  
[address] [id number]  
Khurram Jacobs   
   
                      Iron [Mass/Vol] 18.0 ug/dL Critically low 49.0-181.0 [facility]  
   
                                        Comment on above:   Performed By: #### F  
ERR, FETIBC, B12FOL ####  
[facility]  
[address] [id number]  
Khurram Jacobs   
   
                      TIBC DIRECT 505.0 ug/dL Critically high 261.0-497.0 [facility]  
   
                                        Comment on above:   Performed By: #### F  
ERR, FETIBC, B12FOL ####  
[facility]  
[address], [place] [address]  
Khurram Jacobs   
   
                                                    PROF 14(COMP METB)on   
021   
   
                      Albumin [Mass/Vol] 4.1 g/dL   Normal     3.5-5.0    [facility]  
   
                                        Comment on above:   Performed By: #### C  
MP ####  
[facility]  
[address] [place], [place] [address]  
Khurram Arlene   
   
                                                    Albumin/Globulin   
[Mass ratio]    1.2 {ratio}     Normal                          [facility]  
   
                                        Comment on above:   Performed By: #### C  
MP ####  
[facility]  
[address] [id number]  
Khurram Arlene   
   
                                                    ALP [Catalytic   
activity/Vol]   56 U/L          Normal                    [facility]  
   
                                        Comment on above:   Performed By: #### C  
MP ####  
[facility]  
[address] [place], [place] [address]  
Khurram Arlene   
   
                                                    ALT [Catalytic   
activity/Vol]   21 U/L          Normal          21-72           [facility]  
   
                                        Comment on above:   Performed By: #### C  
MP ####  
[facility]  
[address] [place] [address]  
Khurram Arlene   
   
                      Anion gap [Moles/Vol] 15.2 mmol/L Normal                [facility]  
   
                                        Comment on above:   Performed By: #### C  
MP ####  
[facility]  
[address] [place], [place] [address]  
Khurram Arlene   
   
                                                    AST [Catalytic   
activity/Vol]   21 U/L          Normal          17-59           [facility]  
   
                                        Comment on above:   Performed By: #### C  
MP ####  
[facility]  
[address] [place], [place] [address]  
Khurram Arlene   
   
                      Bilirubin [Mass/Vol] 0.8 mg/dL  Normal     0.2-1.3    The   
[facility]  
   
                                        Comment on above:   Performed By: #### C  
MP ####  
[facility]  
[address] [place], [place] [address]  
Khurram Arlene   
   
                      Calcium [Mass/Vol] 9.0 mg/dL  Normal     8.4-10.2   [facility]  
   
                                        Comment on above:   Performed By: #### C  
MP ####  
[facility]  
[address] [place], [place] [address]  
Khurram Arlene   
   
                      Chloride [Moles/Vol] 105 mmol/L Normal          The   
[facility]  
   
                                        Comment on above:   Performed By: #### C  
MP ####  
[facility]  
[address] [address]  
Khurram Arlene   
   
                      CO2 [Moles/Vol] 28.6 mmol/L Normal     22.0-30.0  [facility]  
   
                                        Comment on above:   Performed By: #### C  
MP ####  
[facility]  
[address] [address]  
Khurram Arlene   
   
                      Creatinine [Mass/Vol] 1.05 mg/dL Normal     0.66-1.25  The  
 [facility]  
   
                                        Comment on above:   Performed By: #### C  
MP ####  
[facility]  
[address] [address]  
Khurram Arlene   
   
                      EGFR-AF AMERICAN >60        Normal     >=60       [facility]  
   
                                        Comment on above:   Performed By: #### C  
MP ####  
[facility]  
[address] [place] [address]  
Khurram Arlene   
   
                      EGFR-NON AF AMERICAN >60        Normal     >=60       The   
[facility]  
   
                                        Comment on above:   Performed By: #### C  
MP ####  
[facility]  
[address] [id number]  
Khurram Arlene   
   
                                                    Globulin (S)   
[Mass/Vol]      3.3 g/dL        Normal                          [facility]  
   
                                        Comment on above:   Performed By: #### C  
MP ####  
[facility]  
[address] [place] [address]  
Khurram Arlene   
   
                      Glucose [Mass/Vol] 99 mg/dL   Normal          [facility]  
   
                                        Comment on above:   Performed By: #### C  
MP ####  
[facility]  
[address] [address]  
Khurram Arlene   
   
                      Potassium [Moles/Vol] 4.8 mmol/L Normal     3.4-5.0    The  
 [facility]  
   
                                        Comment on above:   Performed By: #### C  
MP ####  
[facility]  
[address] [place] [address]  
Khurram Arlene   
   
                      Protein [Mass/Vol] 7.4 g/dL   Normal     6.1-8.2    [facility]  
   
                                        Comment on above:   Performed By: #### C  
MP ####  
[facility]  
[address] [place] [address]  
Khurram Arlene   
   
                      Sodium [Moles/Vol] 144 mmol/L Normal     137-145    [facility]  
   
                                        Comment on above:   Performed By: #### C  
MP ####  
[facility]  
[address], [place] [address]  
Khurram Jacobs   
   
                                                    Urea nitrogen   
[Mass/Vol]      21.0 mg/dL      Critically high 9.0-20.0        [facility]  
   
                                        Comment on above:   Performed By: #### C  
MP ####  
[facility]  
[address] [place], [place] [address]  
Khurram Jacobs   
   
                                                    Urea   
nitrogen/Creatinine   
[Mass ratio]    20.0 mg/mg      Normal                          [facility]  
   
                                        Comment on above:   Performed By: #### C  
MP ####  
[facility]  
[address] [place] [address]  
Khurram Jacobs   
   
                                                    VIT B12 AND FOLATEon   
021   
   
                                                    Cobalamin (Vitamin   
B12) [Mass/Vol] 366.0 pg/mL     Normal          239.0-931.0     [facility]  
   
                                        Comment on above:   Performed By: #### F  
ERR, FETIBC, B12FOL ####  
[facility]  
[address] [place], [place] [address]  
Khurram Jacobs   
   
                      FOLATE     19.10 ng/mL Normal     >=2.76     [facility]  
   
                                        Comment on above:   Performed By: #### F  
ERR, FETIBC, B12FOL ####  
[facility]  
[address] [place], [place] [address]  
Khurram Jacobs   
   
                                                    COVID-19 FRon 2021   
   
                                                    SARS-CoV-2 (COVID-19)   
RNA CHITO+probe Ql   
(Unsp spec)     Negative        Normal          Negative        [facility]  
   
                                        Comment on above:   Order Comment: Healt  
hcare Worker?: N   
   
                                                            Result Comment: Refe  
rence: Negative  
Testing for SARS-CoV-2 by RT-PCR  
This test was developed and its performance characteristics  
determined by [employer], [employer] and validated  
at the [facility]. This test has not  
been FDA cleared or approved. This test has been authorized  
by FDA under an Emergency Use Authorization (EUA). This test  
has been validated in accordance with the FDA's Guidance  
Document (Policy for Diagnostics Testing in Laboratories  
Certified to Perform High Complexity Testing under CLIA  
prior to Emergency Use Authorization for Coronavirus  
Disease-2019 during the Public Health Emergency) issued on  
2020. This test is only authorized for the  
duration of time the declaration that circumstances exist  
justifying the authorization of the emergency use of in  
vitro diagnostic tests for detection of SARS-CoV-2 virus  
and/or diagnosis of COVID-19 infection under section  
564(b)(1) of the Act, 21 U.S.C. 360bbb-3(b)(1), unless the  
authorization is terminated or revoked sooner.  
PERFORMED BY:  
[place] [id number]  
[phone]  
PATHOLOGIST MEDICAL DIRECTOR  
ADE KENNY M.D.   
   
                                                            Performed By: #### S  
OFWILLI COVID-19 CARLOS ENRIQUE ####  
[address]   
   
                                                    Comprehensive Metabolic Pane  
venu 2021   
   
                      Albumin [Mass/Vol] 3.6 g/dL   Normal     3.2-5.5    [facility]  
   
                                        Comment on above:   Order Comment: pleas  
e do last per RN. jkw   
   
                                                            Performed By: #### S  
OFWILLI COVID-19 CARLOS ENRIQUE ####  
[address]   
   
                                                    Albumin/Globulin   
[Mass ratio]    1.2 {ratio}     Normal                          [facility]  
   
                                        Comment on above:   Order Comment: pleas  
e do last per RN. jkw   
   
                                                            Performed By: #### S  
OFWILLI COVID-19 CARLOS ENRIQUE ####  
[place] [address] [place]   
   
                                                    ALP [Catalytic   
activity/Vol]   57 U/L          Normal          32-92           [facility]  
   
                                        Comment on above:   Order Comment: pleas  
e do last per RN. jkw   
   
                                                            Performed By: #### S  
OFWILLI COVID-19 CARLOS ENRIQUE ####  
[place] [address] [place]   
   
                                                    ALT [Catalytic   
activity/Vol]   14 U/L          Normal          10-60           [facility]  
   
                                        Comment on above:   Order Comment: pleas  
e do last per RN. jkw   
   
                                                            Performed By: #### S  
OFWILLI COVID-19 CARLOS ENRIQUE ####  
[address] [place]  
[place] [address] [place]   
   
                                                    AST [Catalytic   
activity/Vol]   15 U/L          Normal          10-42           [facility]  
   
                                        Comment on above:   Order Comment: pleas  
e do last per RN. jkw   
   
                                                            Performed By: #### S  
JOYA ROGERSID-19 CARLOS ENRIQUE ####  
[facility]  
[address]   
   
                      Bilirubin [Mass/Vol] 1.0 mg/dL  Normal     0.3-1.2    [facility]  
   
                                        Comment on above:   Order Comment: pleas  
e do last per RN. jkw   
   
                                                            Performed By: #### S  
JOYA ROGERSID-19 CARLOS ENRIQUE ####  
[facility]  
[address]   
   
                      Calcium [Mass/Vol] 8.8 mg/dL  Normal     8.2-10.2   [facility]  
   
                                        Comment on above:   Order Comment: pleas  
e do last per RN. jkw   
   
                                                            Performed By: #### S  
JOYA ROGERSID-19 CARLOS ENRIQUE ####  
[facility]  
[address]   
   
                      Chloride [Moles/Vol] 109 mmol/L Normal          [facility]  
   
                                        Comment on above:   Order Comment: pleas  
e do last per RN. jkw   
   
                                                            Performed By: #### S  
SUE COVID-19 CARLOS ENRIQUE ####  
[facility]  
[address]   
   
                      CO2 [Moles/Vol] 22.0 mmol/L Normal     22.0-30.0  [facility]  
   
                                        Comment on above:   Order Comment: pleas  
e do last per RN. jkw   
   
                                                            Performed By: #### S  
SUE COVID-19 CARLOS ENRIQUE ####  
[facility]  
[address]   
   
                      Creatinine [Mass/Vol] 0.90 mg/dL Normal     0.64-1.27  [facility]  
   
                                        Comment on above:   Order Comment: pleas  
e do last per RN. jkw   
   
                                                            Performed By: #### S  
JOYA ROGERSID-19 CARLOS ENRIQUE ####  
[facility]  
[address] [place] [address] [place]   
   
                                                    Creatinine Clr Calc   
Pharmacy        113.04          [facility]  
   
                                        Comment on above:   Order Comment: pleas  
e do last per RN. jkw   
   
                                                            Result Comment: PERF  
ORMED BY:  
[place] [id number]  
[phone]  
PATHOLOGIST MEDICAL DIRECTOR  
ADE KENNY M.D.   
   
                                                            Performed By: #### S  
OFWILLI COVID-19 CARLOS ENRIQUE ####  
[address]   
   
                                                    Estimated GFR   
( Bambi > 60            [facility]  
   
                                        Comment on above:   Order Comment: pleas  
e do last per RN. jkw   
   
                                                            Result Comment: GFR   
estimated reference range: According to   
KDOQI  
guidelines, <60 ml/min/1.73m2 is sufficient to diagnose a  
patient with chronic kidney disease.   
   
                                                            Performed By: #### S  
OFWILLI COVID-19 CARLOS ENRIQUE ####  
[address]   
   
                                                    Estimated GFR   
(Non- Am > 60            [facility]  
   
                                        Comment on above:   Order Comment: pleas  
e do last per RN. jkw   
   
                                                            Performed By: #### S  
OFWILLI COVID-19 CARLOS ENRIQUE ####  
[facility]  
[address] [place] [address]   
   
                                                    Globulin (S)   
[Mass/Vol]      2.9 g/dL        [facility]  
   
                                        Comment on above:   Order Comment: pleas  
e do last per RN. jkw   
   
                                                            Performed By: #### S  
OFWILLI COVID-19 CARLOS ENRIQUE ####  
[address]   
   
                      Glucose [Mass/Vol] 92 mg/dL   Normal          [facility]  
   
                                        Comment on above:   Order Comment: pleas  
e do last per RN. jkw   
   
                                                            Result Comment: Rand  
om Glucose Reference Range is dependent on   
time and  
content of last meal. Glucose of more than 200 mg/dL in a  
nonstressed, ambulatory subject supports the diagnosis of  
Diabetes Mellitus.  
ADA recommended reference range   
   
                                                            Performed By: #### S  
OFWILLI COVID-19 CARLOS ENRIQUE ####  
[facility]  
[address] [place] [address]   
   
                      Potassium [Moles/Vol] 4.2 mmol/L Normal     3.5-5.1    [facility]  
   
                                        Comment on above:   Order Comment: pleas  
e do last per RN. jkw   
   
                                                            Performed By: #### S  
MARTHAJOYA MÁRQUEZID-19 CARLOS ENRIQUE ####  
[facility]  
[address]   
   
                      Protein [Mass/Vol] 6.5 g/dL   Normal     6.1-7.9    [facility]  
   
                                        Comment on above:   Order Comment: pleas  
e do last per RN. jkw   
   
                                                            Performed By: #### S  
SUE COVID-19 CARLOS ENRIQUE ####  
[facility]  
[address]   
   
                      Sodium [Moles/Vol] 138 mmol/L Normal     136-146    [facility]  
   
                                        Comment on above:   Order Comment: pleas  
e do last per RN. jkw   
   
                                                            Performed By: #### S  
JOYA ROGERSID-19 CARLOS ENRIQUE ####  
[facility]  
[address]   
   
                                                    Urea nitrogen   
[Mass/Vol]      12 mg/dL        Normal          9-23            [facility]  
   
                                        Comment on above:   Order Comment: pleas  
e do last per RN. jkw   
   
                                                            Performed By: #### S  
MARTHAWILLI COVID-19 CARLOS ENRIQUE ####  
[facility]  
[address]   
   
                                                    ECH echo transthoracicon    
   
                                                    ECH echo   
transthoracic                           [facility]  
[address] [place] [address]  
  
Echocardiogram  
Signed  
  
Patient:   
Harish Sargent MR#:   
[id number]  
[id number]  
: 1970   
Acct:[id number]  
  
Age/Sex: 51 / M ADM   
Date: 21  
  
Loc:  Room:   
[address] [place] [address] Type: ADM   
INOo  
Attending Dr: Valente Ventura MD  
  
Ordering Provider:   
FRANCISCO Nathan  
Date of Service:   
21  
Accession #:   
([id number])   
ECH/ECH echo   
transthoracic:   
dyspnea  
  
  
Copies to: FRANCISCO Nathan MD  
  
  
Weight: 216 lb   
Performed By: Noreen   
Foy, RCS  
BSA: 2.2 m2 BP:   
130/87 mmHg  
HR: 83  
Reason For Study:   
dyspnea  
History: Family   
history of CAD.  
  
Interpretation   
Summary  
The left ventricle   
is moderately   
dilated.  
Mild concentric left   
ventricular   
hypertrophy.  
Ejection Fraction =   
55-60%.  
The left atrium   
appears moderately   
dilated.  
There is trace   
tricuspid   
regurgitation.  
  
Procedure/Quality: A   
two-dimensional   
transthoracic   
echocardiogram with   
color  
flow and Doppler was   
performed. The study   
was technically fair   
in quality.  
Left Ventricle: The   
left ventricle is   
moderately dilated.   
Mild concentric  
left ventricular   
hypertrophy.   
Ejection Fraction =   
55-60%. No left   
ventricular  
thrombus or mass is   
seen.  
Left Atrium: The   
left atrium appears   
moderately dilated.   
The atrial septum  
appears normal.  
Right Atrium: The   
right atrium appears   
normal in size.  
Right Ventricle: The   
right ventricular   
size, thickness and   
function are  
normal.  
Aortic Valve: The   
aortic valve is   
normal in structure   
and function.  
Mitral Valve: The   
mitral valve is   
normal.  
Tricuspid Valve: The   
tricuspid valve is   
normal. There is   
trace tricuspid  
regurgitation.  
Pulmonic Valve: The   
pulmonic valve is   
not well visualized.  
Arteries: Moderate   
aortic root   
dilatation. The   
aortic arch was   
visualized  
and no abnormalities   
were seen.  
Pericardium/Pleura:   
No pericardial   
effusion seen. There   
is no pleural  
effusion.  
IVC/Hepatic Viens:   
The inferior vena   
cava is normal in   
size, with a normal  
collapsibility   
index.  
Measurements with   
Normals  
IVSd: 1.2 cm   
(0.7-1.1 cm)LVIDd:   
6.5 cm (3.7-5.4 cm)  
LVPWd: 1.00 cm   
(0.7-1.1 cm)LVIDs:   
3.9 cm (2.3-3.6 cm)  
LA dimension: 5.0   
cm(2.3-4.0 cm)Ao   
root diam: 4.0   
cm(2.0-3.2 cm)  
  
Doppler with Normals  
RVSP(TR): 42.4 mmHg   
(18-35mmHg)  
MV E max calista: 71.7   
cm/sec(0.8-1.3m/s)  
MV A max calista: 48.7   
cm/sec(0.0-0.0m/s)  
MV E/A: 1.5 (<1.5)  
  
MMode/2D   
Measurements   
Calculations  
RVDd: 4.1 cm FS:   
40.7 % Ao root area:   
LVOT diam: 2.3 cm  
EDV(Teich): 12.5 cm2   
LVOT area: 4.3 cm2  
218.8 ml  
ESV(Teich):  
64.8 ml  
EF(Teich): 70.4 %  
__  
  
LVLd ap4: 8.1 cm   
SV(MOD-sp4):   
LAV(MOD-sp4): LA A2   
area: 22.1 cm2  
EDV(MOD-sp4): 73.2   
ml 81.3 ml  
137.0 ml   
LAV(MOD-sp2): LA A4   
area: 22.8 cm2  
LVLs ap4: 8.1 cm   
75.6 ml LA length   
(vol):  
ESV(MOD-sp4): 5.2 cm  
63.8 ml LA vol: 82.9   
ml  
EF(MOD-sp4): 53.4 %   
LA vol index:  
38.4 ml/m2  
  
Doppler Measurements   
Calculations  
MV dec time: 0.23   
sec E/E' lat: MV dec   
slope: TV max P.5 37.0 mmHg  
E/E' med: 318.0   
cm/sec2  
11.4  
  
__  
TR max calista:  
305.7 cm/sec  
TR max P.4 mmHg  
RAP systole: 5.0   
mmHg  
  
____________________  
__________________  
Electronically   
signed by: KALLIE BARAKAT MD on   
2021 12:05 PM  
  
  
  
  
  
Transcribed By: MAI   
21 1205  
Dictated By: Wallace Barakat MD   
21 1115  
  
Signed By: 21   
1205                [facility]  
   
                                                    OVA AND PARASITEon    
   
                                        OVA AND PARASITE    Final report  
These results were   
obtained using wet   
preparation(s) and  
trichrome stained   
smear. This test   
does not include   
testing  
for Cryptosporidium   
parvum, Cyclospora,   
or Microsporidia.  
No ova, cysts, or   
parasites seen.  
  
One negative   
specimen does not   
rule out the   
possibility of  
a parasitic   
infection.  
  
Performed at: [address] [id number]  
:   
Jimmie Elias   
PhD, Phone:   
[phone]  
PERFORMED BY:  
[place] [id number]  
[phone]  
PATHOLOGIST MEDICAL   
DIRECTOR  
ADE KENNY M.D.    Normal                                  [facility]  
   
                                        Comment on above:   Performed By: #### S  
OFCHUYITAEG, COVID-19 CARLOS ENRIQUE ####  
[address]   
   
                                                    Rapid Covid-19 PCR (CVDRPD)o  
n 2021   
   
                                                    SARS-CoV-2 (COVID-19)   
RNA CHITO+probe Ql   
(Unsp spec)     Not detected    Normal          NOT DETECTED    [facility]  
   
                                        Comment on above:   Result Comment: This  
 test is not yet approved or cleared by   
the   
[place] Food and Drug Administration (FDA).  
This test was developed by [employer], [place], [place]. The   
performance characteristics of  
this test were validated by [facility].   
The results are not intended to be  
used as the sole means for clinical diagnosis or patient   
management decisions. [facility] is authorized under Clinical Laboratory Improvement   
Amendments (CLIA) to perform high-  
complexity testing.  
When diagnostic testing is negative, the possibility of a false   
negative should be considered in  
the context of a patient's recent exposures and the presence of   
clinical signs and symptoms  
consistent with SARS-CoV-2.   
   
                                                            Performed By: #### C  
VDRPD ####  
[facility]  
[address] [place] [address]  
Khurram Jacobs   
   
                                                    SARS-CoV2 IgGon 2021   
   
                                                    SARS-CoV-2 (COVID-19)   
Ab IA Ql        Negative        Normal          Negative        [facility]  
   
                                        Comment on above:   Result Comment: This  
 sample does not contain detectable   
SARS-CoV-2 IgG antibodies.  
This negative result does not rule out SARS-CoV-2 infection.  
Correlation with epidemiologic risk factors and other clinical   
and  
laboratory findings is recommended. Serologic results should not   
be  
used as the sole basis to diagnose or exclude recent SARS-CoV-2  
infection.  
This assay was performed using the [employer])  
SARS-CoV-2 S1/S2 IgG assay.  
This assay detects antibodies against SARS-CoV-2 spike protein  
including the receptor binding domain (RBD).   
   
                                                            Performed By: #### D  
DIM ####  
[facility]  
[address] [place] [address]  
Khurram Arlene   
   
                                                    Scan and CBCon 2021   
   
                      Anisocytosis Ql (Bld) Marked     Normal                [facility]  
   
                                        Comment on above:   Order Comment: pleas  
e do last per RN. jkw   
   
                                                            Performed By: #### S  
SUE COVID-19 CARLOS ENRIQUE ####  
[facility]  
[address]   
   
                                                    Basophils (Bld)   
[#/Vol]         0.0 10*3/uL     Normal          0.0-0.2         [facility]  
   
                                        Comment on above:   Order Comment: pleas  
e do last per RN. jkw   
   
                                                            Performed By: #### S  
OFWILLI COVID-19 CARLOS ENRIQUE ####  
[facility]  
[address]   
   
                                                    Basophils/100 WBC   
(Bld)           1.0 %           Normal          .               [facility]  
   
                                        Comment on above:   Order Comment: pleas  
e do last per RN. jkw   
   
                                                            Performed By: #### S  
SUE COVID-19 CARLOS ENRIQUE ####  
[address]   
   
                                                    Eosinophils (Bld)   
[#/Vol]         0.1 10*3/uL     Normal          0.0-0.45        [facility]  
   
                                        Comment on above:   Order Comment: pleas  
e do last per RN. jkw   
   
                                                            Performed By: #### S  
OFWILLI COVID-19 CARLOS ENRIQUE ####  
[facility]  
[address] [place] [address]   
   
                                                    Eosinophils/100 WBC   
(Bld)           2.5 %           Normal          .               [facility]  
   
                                        Comment on above:   Order Comment: pleas  
e do last per RN. jkw   
   
                                                            Performed By: #### S  
OFWILLI COVID-19 CARLOS ENRIQUE ####  
[address]   
   
                                                    Erythrocyte   
distribution width   
(RBC) [Ratio]   23.1 %          High            12.0-14.8       [facility]  
   
                                        Comment on above:   Order Comment: pleas  
e do last per RN. jkw   
   
                                                            Performed By: #### S  
OFWILLI COVID-19 CARLOS ENRIQUE ####  
[facility]  
[address]   
   
                                                    Hematocrit (Bld)   
[Volume fraction] 28.8 %          Low             38.8-50.0       [facility]  
   
                                        Comment on above:   Order Comment: pleas  
e do last per RN. jkw   
   
                                                            Performed By: #### S  
JU ROGERS-19 CARLOS ENRIQUE ####  
[facility]  
[address]   
   
                                                    Hemoglobin (Bld)   
[Mass/Vol]      8.8 g/dL        Low             13.0-17.0       [facility]  
   
                                        Comment on above:   Order Comment: pleas  
e do last per RN. jkw   
   
                                                            Performed By: #### S  
JOYA ROGERSID-19 CARLOS ENRIQUE ####  
[facility]  
[address]   
   
                                                    Lymphocytes (Bld)   
[#/Vol]         1.1 10*3/uL     Normal          1.00-4.8        [facility]  
   
                                        Comment on above:   Order Comment: pleas  
e do last per RN. jkw   
   
                                                            Performed By: #### S  
JOYA ROGERSID-19 CARLOS ENRIQUE ####  
[address]   
   
                                                    Lymphocytes/100 WBC   
(Bld)           24.9 %          Normal          .               [facility]  
   
                                        Comment on above:   Order Comment: pleas  
e do last per RN. jkw   
   
                                                            Performed By: #### S  
JOYA ROGERSID-19 CARLOS ENRIQUE ####  
[address]   
   
                                                    MCH (RBC) [Entitic   
mass]           19.7 pg         Low             27.5-35.2       [facility]  
   
                                        Comment on above:   Order Comment: pleas  
e do last per RN. jkw   
   
                                                            Performed By: #### S  
JOYA ROGERSID-19 CARLOS ENRIQUE ####  
[address]   
   
                                                    MCV (RBC) [Entitic   
vol]            64.3 fL         Low             83.5-101        [facility]  
   
                                        Comment on above:   Order Comment: pleas  
e do last per RN. jkw   
   
                                                            Performed By: #### S  
JOYA ROGERSID-19 CARLOS ENRIQUE ####  
[address]   
   
                                                    Mean Corpuscular HGB   
Conc            30.6 g/dL       Low             32.5-35.6       [facility]  
   
                                        Comment on above:   Order Comment: pleas  
e do last per RN. jkw   
   
                                                            Performed By: #### S  
JOYA ROGERSID-19 CARLOS ENRIQUE ####  
[facility]  
[address]   
   
                      Microcytosis Marked     Normal                [facility]  
   
                                        Comment on above:   Order Comment: pleas  
e do last per RN. jkw   
   
                                                            Performed By: #### S  
SUE COVID-19 CARLOS ENRIQUE ####  
[facility]  
[address] [place] [address] [place]   
   
                                                    Monocytes (Bld)   
[#/Vol]         0.5 10*3/uL     Normal          0.0-0.8         [facility]  
   
                                        Comment on above:   Order Comment: pleas  
e do last per RN. jkw   
   
                                                            Performed By: #### S  
SUE COVID-19 CARLOS ENRIQUE ####  
[facility]  
[address] [place] [address] [place]   
   
                                                    Monocytes/100 WBC   
(Bld)           11.8 %          Normal          .               [facility]  
   
                                        Comment on above:   Order Comment: pleas  
e do last per RN. jkw   
   
                                                            Performed By: #### S  
SUE COVID-19 CARLOS ENRIQUE ####  
[facility]  
[address] [place] [address] [place]   
   
                                                    Neutrophils (Bld)   
[#/Vol]         2.5 10*3/uL     Normal          1.8-7.7         [facility]  
   
                                        Comment on above:   Order Comment: pleas  
e do last per RN. jkw   
   
                                                            Performed By: #### S  
SUE COVID-19 CARLOS ENRIQUE ####  
[facility]  
[address] [place] [address] [place]   
   
                                                    Neutrophils/100 WBC   
(Bld)           59.8 %          Normal          .               [facility]  
   
                                        Comment on above:   Order Comment: pleas  
e do last per RN. jkw   
   
                                                            Performed By: #### S  
SUE COVID-19 CARLOS ENRIQUE ####  
[facility]  
[address] [place] [address] [place]   
   
                                                    Nucleated RBC/100 WBC   
(Bld) [Ratio]   0.1 %           Normal          0-0.5           [facility]  
   
                                        Comment on above:   Order Comment: pleas  
e do last per RN. jkw   
   
                                                            Performed By: #### S  
OFWILLI, COVID-19 CARLOS ENRIQUE ####  
[facility]  
[address] [place] [address] [place]   
   
                      Ovalocytes Slight     Normal                [facility]  
   
                                        Comment on above:   Order Comment: pleas  
e do last per RN. jkw   
   
                                                            Performed By: #### S  
OFCHUYITAEG COVID-19 CARLOS ENRIQUE ####  
[facility]  
[address]   
   
                      Platelet Estimate Normal     Normal     Normal     [facility]  
   
                                        Comment on above:   Order Comment: pleas  
e do last per RN. jkw   
   
                                                            Performed By: #### S  
OFCHUYITAEG COVID-19 CARLOS ENRIQUE ####  
[facility]  
[address]   
   
                                                    Platelet mean volume   
(Bld) [Entitic vol] 8.5 fL          Normal          6.6-10.1        [facility]  
   
                                        Comment on above:   Order Comment: pleas  
e do last per RN. jkw   
   
                                                            Performed By: #### S  
OFCHUYITAEG, COVID-19 CARLOS ENRIQUE ####  
[facility]  
[address] [place] [address]   
   
                      Platelet Morphology Normal     Normal     Normal     [facility]  
   
                                        Comment on above:   Order Comment: pleas  
e do last per RN. jkw   
   
                                                            Result Comment: PERF  
ORMED BY:  
[place] [id number]  
[phone]  
PATHOLOGIST MEDICAL DIRECTOR  
ADE KENNY M.D.   
   
                                                            Performed By: #### S  
OFWILLI COVID-19 CARLOS ENRIQUE ####  
[facility]  
[address]   
   
                                                    Platelets (Bld)   
[#/Vol]         257 10*3/uL     Normal          150-450         [facility]  
   
                                        Comment on above:   Order Comment: pleas  
e do last per RN. jkw   
   
                                                            Performed By: #### S  
OFCHUYITAEG, COVID-19 CARLOS ENRIQUE ####  
[facility]  
[address]   
   
                      RBC (Bld) [#/Vol] 4.48 10*6/uL Normal     3.90-5.60  [facility]  
   
                                        Comment on above:   Order Comment: pleas  
e do last per RN. jkw   
   
                                                            Performed By: #### S  
MARTHAWILLI COVID-19 CARLOS ENRIQUE ####  
[facility]  
[address] [place] [address]   
   
                      Tear Drop Cells Slight     Normal                [facility]  
   
                                        Comment on above:   Order Comment: pleas  
e do last per RN. jkw   
   
                                                            Performed By: #### S  
MARTHAWILLI COVID-19 CARLOS ENRIQUE ####  
[facility]  
[address] [place] [address]   
   
                      WBC (Bld) [#/Vol] 4.2 10*3/uL Low        4.5-11.0   [facility]  
   
                                        Comment on above:   Order Comment: pleas  
e do last per RN. jkw   
   
                                                            Performed By: #### S  
MARTHAWILLI COVID-19 CARLOS ENRIQUE ####  
[facility]  
[address] [place] [address]   
   
                                                    XR CHEST 2 Von 2021   
   
                                        XR CHEST 2 V        XR CHEST 2 V  
2021 1:15 PM EDT  
Indication: Dyspnea  
Technique: Routine   
radiographs of the   
chest were obtained.  
Comparison: None.  
Findings: The lungs   
are hyperinflated.   
There is   
peribronchial   
cuffing suggesting  
probable   
inflammation of the   
airways. No   
consolidations,   
effusions, or  
pneumothorax. Heart   
is normal in size   
and contour. No   
acute abnormalities   
of the  
osseous structures.   
There is a   
moderate-sized   
hiatal hernia.  
Impression:  
1. Bronchitis   
without focal   
pneumonia.  
2. Moderate-sized   
hiatal hernia.  
Electronically   
authenticated by:   
FABRICE BARNEY Date:   
2021 04:25    Normal                                  [facility]  
   
                                                    ABO/Rh Retypeon 2021   
   
                      ABO/RH Recheck Result Positive   Normal                [facility]  
   
                                        Comment on above:   Result Comment: PERF  
ORMED BY:  
[place] [id number]  
[phone]  
PATHOLOGIST MEDICAL DIRECTOR  
ADE KENNY M.D.   
   
                                                    B-Type Natriuretic Peptideon  
 2021   
   
                                                    Natriuretic peptide B   
(Bld) [Mass/Vol] 55.0 pg/mL      Normal          5-100           [facility]  
   
                                        Comment on above:   Result Comment: PERF  
ORMED BY:  
[place] [id number]  
[phone]  
PATHOLOGIST MEDICAL DIRECTOR  
ADE KENNY M.D.   
   
                                                            Performed By: #### B  
NP, CKMB, CMP, TROP, CK ####  
[place] [id number] [facility]   
   
                                                    CBC AUTO DIFFon 2021   
   
                      BASO #     0.0 103/ul Normal     0.0-0.1    [facility]  
   
                                        Comment on above:   Performed By: #### C  
BC ####  
[facility]  
[address] [id number]  
Khurram Arlene   
   
                                                    Basophils/100 WBC   
(Bld)           0.3 %           Normal          0.2-2.0         [facility]  
   
                                        Comment on above:   Performed By: #### C  
BC ####  
[facility]  
[address] [address]  
Khurram Arlene   
   
                      EO #       0.0 103/ul Normal     0.0-0.7    [facility]  
   
                                        Comment on above:   Performed By: #### C  
BC ####  
[facility]  
[address] [address]  
Khurram Arlene   
   
                                                    Eosinophils/100 WBC   
(Bld)           0.2 %           Critically low  0.9-7.0         [facility]  
   
                                        Comment on above:   Performed By: #### C  
BC ####  
[facility]  
[address] [address]  
Khurram Arlene   
   
                                                    Erythrocyte   
distribution width   
(RBC) [Ratio]   17.5 %          Critically high 11.0-15.0       [facility]  
   
                                        Comment on above:   Performed By: #### C  
BC ####  
[facility]  
[address] [address]  
Khurram Arlene   
   
                                                    Hematocrit (Bld)   
[Volume fraction] 30.6 %          Critically low  42.0-54.0       [facility]  
   
                                        Comment on above:   Performed By: #### C  
BC ####  
[facility]  
[address] [id number]  
Khurram Arlene   
   
                                                    Hemoglobin (Bld)   
[Mass/Vol]      7.9 g/dL        Critically low  14.0-18.0       [facility]  
   
                                        Comment on above:   Performed By: #### C  
BC ####  
[facility]  
[address] [id number]  
Khurram Arlene   
   
                      IG #       0.04 10e3/ul Critically high 0.00-0.03  [facility]  
   
                                        Comment on above:   Performed By: #### C  
BC ####  
[facility]  
[address] [id number]  
Khurram Arlene   
   
                      IG %       0.4 %      Normal     0.0-0.5    [facility]  
   
                                        Comment on above:   Performed By: #### C  
BC ####  
[facility]  
[address] [place], [place] [address]  
Khurram Arlene   
   
                      LYMPH #    0.9 103/ul Critically low 1.2-3.8    [facility]  
   
                                        Comment on above:   Performed By: #### C  
BC ####  
[facility]  
[address] [place] [address]  
Khurram Jacobs   
   
                                                    Lymphocytes/100 WBC   
(Bld)           9.6 %           Critically low  20.5-60.0       [facility]  
   
                                        Comment on above:   Performed By: #### C  
BC ####  
[facility]  
[address] [place] [address]  
Khurram Jacobs   
   
                      MANUAL DIFF REQ NO         Normal                [facility]  
   
                                        Comment on above:   Performed By: #### C  
BC ####  
[facility]  
[address] [place], [place] [address]  
Khurramlona Jacobs   
   
                                                    MCH (RBC) [Entitic   
mass]           16.9 pg         Critically low  25.9-34.0       [facility]  
   
                                        Comment on above:   Performed By: #### C  
BC ####  
[facility]  
[address] [place] [address]  
Khurram Arlene   
   
                      MCHC (RBC) [Mass/Vol] 25.8 g/dL  Critically low 29.9-35.2   
 [facility]  
   
                                        Comment on above:   Performed By: #### C  
BC ####  
[facility]  
[address] [place], [place] [address]  
Khurram Arlene   
   
                                                    MCV (RBC) [Entitic   
vol]            65.5 fL         Critically low  80.0-94.0       [facility]  
   
                                        Comment on above:   Performed By: #### C  
BC ####  
[facility]  
[address] [place] [address]  
Khurram Arlene   
   
                      MONO #     0.4 103/ul Normal     0.3-0.8    [facility]  
   
                                        Comment on above:   Performed By: #### C  
BC ####  
[facility]  
[address] [place], [place] [address]  
Khurram Jacobs   
   
                                                    Monocytes/100 WBC   
(Bld)           4.5 %           Normal          1.7-12.0        [facility]  
   
                                        Comment on above:   Performed By: #### C  
BC ####  
[facility]  
[address] [place] [address]  
Khurram Jacobs   
   
                      NEUT #     8.2 103/ul Critically high 1.4-6.5    [facility]  
   
                                        Comment on above:   Performed By: #### C  
BC ####  
[facility]  
[address] [place] [address]  
Khurram Jacobs   
   
                                                    Neutrophils/100 WBC   
(Bld)           85.0 %          Critically high 43.0-75.0       [facility]  
   
                                        Comment on above:   Performed By: #### C  
BC ####  
[facility]  
[address] [place] [address]  
Khurram Jacobs   
   
                                                    Platelet mean volume   
(Bld) [Entitic vol] 9.5 fL          Normal          9.5-13.5        [facility]  
   
                                        Comment on above:   Performed By: #### C  
BC ####  
[facility]  
[address] [place], [place] [address]  
Khurram Jacobs   
   
                      PLT        342 103/ul Normal     150-450    [facility]  
   
                                        Comment on above:   Performed By: #### C  
BC ####  
[facility]  
[address] [place], [place] [address]  
Khurram Jacobs   
   
                      RBC        4.67 106/ul Critically low 4.70-6.10  [facility]  
   
                                        Comment on above:   Performed By: #### C  
BC ####  
[facility]  
[address] [place], [place] [address]  
Khurram Mendezen   
   
                      WBC        9.6 103/ul Normal     4.0-11.0   [facility]  
   
                                        Comment on above:   Performed By: #### C  
BC ####  
[facility]  
[address] [place] [address]  
Khurram Jacobs   
   
                                                    COVID-19 Antigenon   
1   
   
                                        COVID-19 Antigen    Healthcare Worker?:   
N  
Carlos Enrique Reference  
--------------------  
--------------------  
Carlos Enrique Reference  
Negative  
SARS-CoV+SARS-CoV-2   
(COVID-19) Ag   
[Presence] in   
Respiratory specimen   
by Rapid immunoassay  
Negative for SARS   
Antigen by [facility]  
COVID19 Blank Space  
--------------------  
--------------------  
--------------  
Carlos Enrique Disclaimer  
Negative results,   
from patients with   
symptom  
Carlos Enrique Disclaimer  
onset beyond five   
days, should be   
treated as  
Carlos Enrique Disclaimer  
presumptive and   
confirmation with a   
molecular  
Carlos Enrique Disclaimer  
assay, if necessary,   
for patient   
management,  
Carlos Enrique Disclaimer  
may be performed.   
Negative results do   
not rule  
Carlos Enrique Disclaimer  
out COVID-19 and   
should not be used   
as the sole  
Carlos Enrique Disclaimer  
basis for treatment   
or patient   
management  
Carlos Enrique Disclaimer  
decisions, including   
infection control   
decisions.  
Carlos Enrique Disclaimer  
Negative results   
should be considered   
in the  
Carlos Enrique Disclaimer  
context of a   
patient's recent   
exposures, history  
Carlos Enrique Disclaimer  
and the presence of   
clinical signs and   
symptoms  
Carlos Enrique Disclaimer  
consistent with   
COVID-19.  
COVID19 Blank Space  
--------------------  
--------------------  
--------------  
Carlos Enrique Disclaimer  
The Carlos Enrique SARS   
Antigen [facility] does not   
differentiate  
Carlos Enrique Disclaimer  
between SARS-CoV and   
SARS-CoV-2.  
COVID19 Blank Space  
--------------------  
--------------------  
--------------  
Carlos Enrique Disclaimer  
This test was   
developed and its   
performance  
Carlos Enrique Disclaimer  
characteristic   
determined by [employer] and  
Carlos Enrique Disclaimer  
validated at   
[facility]. This  
Carlos Enrique Disclaimer  
test has not been   
FDA cleared or   
approved. This  
Carlos Enrique Disclaimer  
test has been   
authorized by FDA   
under an Emergency   
Use  
Carlos Enrique Disclaimer  
Authorization (EUA).   
This test has been   
validated  
Carlos Enrique Disclaimer  
in accordance with   
the FDA's Guidance   
Document (Policy  
Carlos Enrique Disclaimer  
for Diagnostics   
Testing in   
Laboratories   
Certified to  
Carlos Enrique Disclaimer  
Perform High   
Complexity Testing   
under CLIA prior to  
Carlos Enrique Disclaimer  
Emergency Use   
Authorization for   
Coronavirus  
Carlos Enrique Disclaimer  
isease- during   
the Public Health   
Emergency)  
Carlos Enrique Disclaimer  
issued on 2020. This test is   
only authorized  
Carlos Enrique Disclaimer  
for the duration of   
time the declaration   
that  
Carlos Enrique Disclaimer  
circumstances exist   
justifying the   
authorization of  
Carlos Enrique Disclaimer  
the emergency use of   
in vitro diagnostic   
tests for  
Carlos Enrique Disclaimer  
detection of   
SARS-CoV-2 virus   
and/or diagnosis of  
Carlos Enrique Disclaimer  
COVID-19 infection   
under section   
564(b)(1) of the  
Carlos Enrique Disclaimer  
Act, 21 U.S.C.   
360bbb-3(b)(1),   
unless the  
Carlos Enrique Disclaimer  
authorization is   
terminated or   
revoked sooner.  
PERFORMED BY:  
[place] [id number]  
[phone]  
PATHOLOGIST MEDICAL   
DIRECTOR  
ADE KENNY M.D.    Normal                                  [facility]  
   
                                        Comment on above:   Performed By: #### S  
OFCHUYITAEG, COVID-19 CARLOS ENRIQUE ####  
[address]   
   
                                                    [facility]   
   
                                                    SARS-CoV-2 (COVID-19)   
RNA CHITO+probe Ql   
(Unsp spec)     Negative        Normal          Negative        [facility]  
   
                                        Comment on above:   Order Comment: Healt  
hcare Worker?: N   
   
                                                            Result Comment: Refe  
rence: Negative  
Testing for SARS-CoV-2 by RT-PCR  
This test was developed and its performance characteristics  
determined by [employer] and validated  
at the [facility]. This test has not  
been FDA cleared or approved. This test has been authorized  
by FDA under an Emergency Use Authorization (EUA). This test  
has been validated in accordance with the FDA's Guidance  
Document (Policy for Diagnostics Testing in Laboratories  
Certified to Perform High Complexity Testing under CLIA  
prior to Emergency Use Authorization for Coronavirus  
Disease-2019 during the Public Health Emergency) issued on  
2020. This test is only authorized for the  
duration of time the declaration that circumstances exist  
justifying the authorization of the emergency use of in  
vitro diagnostic tests for detection of SARS-CoV-2 virus  
and/or diagnosis of COVID-19 infection under section  
564(b)(1) of the Act, 21 U.S.C. 360bbb-3(b)(1), unless the  
authorization is terminated or revoked sooner.  
PERFORMED BY:  
[place] [id number]  
[phone]  
PATHOLOGIST MEDICAL DIRECTOR  
ADE KENNY M.D.   
   
                                                            Performed By: #### S  
OFIANANGELA, COVID-19 CARLOS ENRIQUE ####  
[address]   
   
                                                    Coagulation Profileon 2021   
   
                                                    aPTT Coag (Bld)   
[Time]          28.0 s          Normal          25.1-36.5       [facility]  
   
                                        Comment on above:   Result Comment: PERF  
ORMED BY:  
[place] [id number]  
[phone]  
PATHOLOGIST MEDICAL DIRECTOR  
ADE KENNY M.D.   
   
                                                            Performed By: #### S  
OFIANEG, COVID-19 CARLOS ENRIQUE ####  
[address]   
   
                                                    INR Coag (PPP)   
[Relative time] 1.1 {INR}       Normal                          [facility]  
   
                                        Comment on above:   Result Comment: INR   
Therapeutic Range  
A) Pre- and Peroperative OAT started two weeks before  
surgery. NOT HIP SURGERY: 1.5 - 2.5  
HIP SURGERY: 2 - 3  
B) Primary and secondary prevention of venous  
THROMBOSIS: 2 - 3  
C) Active venous thrombosis, pulmonary embolism  
and prevention of recurrent venous thrombosis: 2 - 3  
D) Prevention of arterial thromboembolism  
including patients with mechanical heart valves: 3 - 4.5   
   
                                                            Performed By: #### S  
OFIANEG, COVID-19 CARLOS ENRIQUE ####  
[address]   
   
                      PT Coag (PPP) [Time] 12.5 s     Normal     9.0-12.9   [facility]  
   
                                        Comment on above:   Performed By: #### S  
OFIANEG, COVID-19 CARLOS ENRIQUE ####  
[address]   
   
                                                    Comprehensive Metabolic Pane  
venu 2021   
   
                      Albumin [Mass/Vol] 4.3 g/dL   Normal     3.2-5.5    [facility]  
   
                                        Comment on above:   Performed By: #### B  
NP, CKMB, CMP, TROP, CK ####  
[facility]  
[address]   
   
                                                    Albumin/Globulin   
[Mass ratio]    1.4 {ratio}     Normal                          [facility]  
   
                                        Comment on above:   Performed By: #### B  
NP, CKMB, CMP, TROP, CK ####  
[facility]  
[address]   
   
                                                    ALP [Catalytic   
activity/Vol]   66 U/L          Normal          32-92           [facility]  
   
                                        Comment on above:   Performed By: #### B  
NP, CKMB, CMP, TROP, CK ####  
[facility]  
[address]   
   
                                                    ALT [Catalytic   
activity/Vol]   16 U/L          Normal          10-60           [facility]  
   
                                        Comment on above:   Performed By: #### B  
NP, CKMB, CMP, TROP, CK ####  
[facility]  
[address] [place] [address]   
   
                                                    AST [Catalytic   
activity/Vol]   23 U/L          Normal          10-42           [facility]  
   
                                        Comment on above:   Performed By: #### B  
NP, CKMB, CMP, TROP, CK ####  
[facility]  
[address]   
   
                      Bilirubin [Mass/Vol] 0.9 mg/dL  Normal     0.3-1.2    [facility]  
   
                                        Comment on above:   Performed By: #### B  
NP, CKMB, CMP, TROP, CK ####  
[facility]  
[address] [place] [address] [place]   
   
                      Calcium [Mass/Vol] 9.1 mg/dL  Normal     8.2-10.2   [facility]  
   
                                        Comment on above:   Performed By: #### B  
NP, CKMB, CMP, TROP, CK ####  
[facility]  
[address]   
   
                      Chloride [Moles/Vol] 105 mmol/L Normal          [facility]  
   
                                        Comment on above:   Performed By: #### B  
NP, CKMB, CMP, TROP, CK ####  
[facility]  
[address]   
   
                      CO2 [Moles/Vol] 21.1 mmol/L Low        22.0-30.0  [facility]  
   
                                        Comment on above:   Performed By: #### B  
NP, CKMB, CMP, TROP, CK ####  
[facility]  
[address]   
   
                      Creatinine [Mass/Vol] 0.92 mg/dL Normal     0.64-1.27  [facility]  
   
                                        Comment on above:   Performed By: #### B  
NP, CKMB, CMP, TROP, CK ####  
[facility]  
[address]   
   
                                                    Creatinine Clr Calc   
Pharmacy        110.53          [facility]  
   
                                        Comment on above:   Result Comment: PERF  
ORMED BY:  
[place] [id number]  
[phone]  
PATHOLOGIST MEDICAL DIRECTOR  
ADE KENNY M.D.   
   
                                                            Performed By: #### B  
NP, CKMB, CMP, TROP, CK ####  
[address]   
   
                                                    Estimated GFR   
( Bambi > 60            [facility]  
   
                                        Comment on above:   Result Comment: GFR   
estimated reference range: According to   
KDOQI  
guidelines, <60 ml/min/1.73m2 is sufficient to diagnose a  
patient with chronic kidney disease.   
   
                                                            Performed By: #### B  
NP, CKMB, CMP, TROP, CK ####  
[address]   
   
                                                    Estimated GFR   
(Non- Am > 60            [facility]  
   
                                        Comment on above:   Performed By: #### B  
NP, CKMB, CMP, TROP, CK ####  
[facility]  
[address]   
   
                                                    Globulin (S)   
[Mass/Vol]      3.1 g/dL        [facility]  
   
                                        Comment on above:   Performed By: #### B  
NP, CKMB, CMP, TROP, CK ####  
[facility]  
[address]   
   
                      Glucose [Mass/Vol] 88 mg/dL   Normal          [facility]  
   
                                        Comment on above:   Result Comment: Rand  
 Glucose Reference Range is dependent on  
   
time and  
content of last meal. Glucose of more than 200 mg/dL in a  
nonstressed, ambulatory subject supports the diagnosis of  
Diabetes Mellitus.  
ADA recommended reference range   
   
                                                            Performed By: #### B  
NP, CKMB, CMP, TROP, CK ####  
[address]   
   
                      Potassium [Moles/Vol] 4.1 mmol/L Normal     3.5-5.1    [facility]  
   
                                        Comment on above:   Performed By: #### B  
NP, CKMB, CMP, TROP, CK ####  
[address]   
   
                      Protein [Mass/Vol] 7.4 g/dL   Normal     6.1-7.9    [facility]  
   
                                        Comment on above:   Performed By: #### B  
NP, CKMB, CMP, TROP, CK ####  
[address]   
   
                      Sodium [Moles/Vol] 136 mmol/L Normal     136-146    [facility]  
   
                                        Comment on above:   Performed By: #### B  
NP, CKMB, CMP, TROP, CK ####  
[address]   
   
                                                    Urea nitrogen   
[Mass/Vol]      15 mg/dL        Normal          9-23            [facility]  
   
                                        Comment on above:   Performed By: #### B  
NP, CKMB, CMP, TROP, CK ####  
[address]   
   
                                                    Creatine Kinaseon 2021  
   
   
                                                    CK [Catalytic   
activity/Vol]   121 U/L         Normal                    [facility]  
   
                                        Comment on above:   Performed By: #### B  
NP, CKMB, CMP, TROP, CK ####  
[address]   
   
                                                    Creatinine Kinase MBon    
   
                      CK.MB [Mass/Vol] 1.7 ng/mL  Normal     0.6-6.3    [facility]  
   
                                        Comment on above:   Performed By: #### B  
NP, CKMB, CMP, TROP, CK ####  
[address]  
[place] [address] [facility]   
   
                      CKMB Relative Index 1.4 %      Normal     0.00-2.50  [facility]  
   
                                        Comment on above:   Performed By: #### B  
NP, CKMB, CMP, TROP, CK ####  
[facility]  
[address] [id number] [facility]   
   
                                                    D-DIMERon 2021   
   
                      D-DIMER    6.85 mg/L FEU Critically high 0.19-0.50  [facility]  
   
                                        Comment on above:   Result Comment: Test  
 repeated. Critical value verified.   
   
                                                            Performed By: #### D  
DIM ####  
[facility]  
[address] [address]  
Khurram Jacobs   
   
                      D-DIMER COMMENTS SEE BELOW  Normal                [facility]  
   
                                        Comment on above:   Result Comment: Incr  
eases in D-Dimer concentration observed   
with   
thromboembolic events can be variable due to localization, size,   
and age of the thrombus. Therefore, a thromboembolic event   
cannot be diagnosed with certainty on the basis of the reference   
range. D-Dimers may also be elevated for a variety of disorders   
including: advanced age, pregnancy, coronary disease, cancer,   
liver disease, infection, inflammation, hematoma, DIC, trauma,   
post-surgery, diabetes, thrombolytic or anticoagulant therapy,   
stress, and generalized hospitalization.   
   
                                                            Performed By: #### D  
DIM ####  
[facility]  
[address] [address]  
Khurram Jacobs   
   
                                                    ECG 12 lead ECGon 2021  
   
   
                                        ECG 12 lead ECG     [facility]  
[address] [place] [address]  
  
Electrocardiograph   
Report  
Signed  
  
Patient:   
Harish Sargent MR#:   
[id number]  
[id number]  
: 1970   
Acct:[id number]  
  
Age/Sex: 51 / M ADM   
Date: 21  
  
Loc:  Room:   
[address] [place] [address] Type: ADM   
INOo  
Attending Dr: Valente Ventura MD  
  
Ordering Provider:   
Christiano Card MD  
Date of Service:   
21  
Accession #:   
([id number])   
ECG/ECG 12 lead ECG:   
Shortness of   
Breath/Dyspnea  
  
  
Copies to:  
  
  
Test Reason :  
Blood Pressure :   
159/080 mmHG  
Vent. Rate : 087 BPM   
Atrial Rate : 087   
BPM  
P-R Int : 176 ms QRS   
Dur : 108 ms  
QT Int : 414 ms   
P-R-T Axes : 044 045   
042 degrees  
QTc Int : 498 ms  
  
Sinus rhythm with   
occasional premature   
ventricular   
complexes  
Prolonged QT  
Abnormal ECG  
No previous ECGs   
available  
Confirmed by CHRISTIANO CARD MD ([id number]) on   
2021 7:28:46 PM  
  
Referred By:   
Electronically   
Signed By:CHRISTIANO CARD MD  
  
  
  
Transcribed By: MUS  
Dictated By: Christiano Card MD 21   
[id number]  
  
Signed By: 21   
1929                Normal                                  [facility]  
   
                                                    Ferritinon 2021   
   
                      Ferritin [Mass/Vol] 3.4 ng/mL  Low        23.9-336.2 [facility]  
   
                                        Comment on above:   Performed By: #### S  
JOYA ROGERSID-19 CARLOS ENRIQUE ####  
[facility]  
[address]   
   
                                                    Haptoglobinon 2021   
   
                      Haptoglobin 196 mg/dL  Normal          [facility]  
   
                                        Comment on above:   Result Comment: PERF  
ORMED BY:  
[place] [id number]  
[phone]  
PATHOLOGIST MEDICAL DIRECTOR  
ADE KENNY M.D.   
   
                                                            Performed By: #### S  
JOYA ROGERSID-19 CARLOS ENRIQUE ####  
[facility]  
[address] [place] [address]   
   
                                                    Ironon 2021   
   
                      Iron [Mass/Vol] 10 ug/dL   Low             [facility]  
   
                                        Comment on above:   Performed By: #### S  
JOYA ROGERSID-19 CARLOS ENRIQUE ####  
[facility]  
[address] [place] [address]   
   
                                                    LDH Lactate Dehydrogenaseon   
2021   
   
                                                    LDH Lactate   
Dehydrogenase   358 U/L         High                      [facility]  
   
                                        Comment on above:   Performed By: #### S  
JOYA ROGERSID-19 CARLOS ENRIQUE ####  
[facility]  
[address]   
   
                                                    LeukoReduced RBCon    
   
                      LeukoReduced RBC NOT AVAILABLE Normal                [facility]  
   
                                                    PROF 14(COMP METB)on   
021   
   
                      Albumin [Mass/Vol] 4.5 g/dL   Normal     3.5-5.0    [facility]  
   
                                        Comment on above:   Performed By: #### D  
DIM ####  
[facility]  
[address] [place] [address]  
Khurram Arlene   
   
                                                    Albumin/Globulin   
[Mass ratio]    1.1 {ratio}     Normal                          [facility]  
   
                                        Comment on above:   Performed By: #### D  
DIM ####  
[facility]  
[address] [place], [place] [address]  
Khurram Arlene   
   
                                                    ALP [Catalytic   
activity/Vol]   83 U/L          Normal                    [facility]  
   
                                        Comment on above:   Performed By: #### D  
DIM ####  
[facility]  
[address] [place], [place] [address]  
Khurram Arlene   
   
                                                    ALT [Catalytic   
activity/Vol]   23 U/L          Normal          21-72           [facility]  
   
                                        Comment on above:   Performed By: #### D  
DIM ####  
[facility]  
[address] [place], [place] [address]  
Khurram Arlene   
   
                      Anion gap [Moles/Vol] 16.4 mmol/L Normal                [facility]  
   
                                        Comment on above:   Performed By: #### D  
DIM ####  
[facility]  
[address] [place], [place] [address]  
Khurram Arlene   
   
                                                    AST [Catalytic   
activity/Vol]   18 U/L          Normal          17-59           [facility]  
   
                                        Comment on above:   Performed By: #### D  
DIM ####  
[facility]  
[address] [place] [address]  
Khurram Arlene   
   
                      Bilirubin [Mass/Vol] 0.8 mg/dL  Normal     0.2-1.3    The   
[facility]  
   
                                        Comment on above:   Performed By: #### D  
DIM ####  
[facility]  
[address] [place], [place] [address]  
Khurram Arlene   
   
                      Calcium [Mass/Vol] 9.3 mg/dL  Normal     8.4-10.2   [facility]  
   
                                        Comment on above:   Performed By: #### D  
DIM ####  
[facility]  
[address] [place], [place] [address]  
Khurram Arlene   
   
                      Chloride [Moles/Vol] 104 mmol/L Normal          The   
[facility]  
   
                                        Comment on above:   Performed By: #### D  
DIM ####  
[facility]  
[address] [id number]  
Khurram Arlene   
   
                      CO2 [Moles/Vol] 23.2 mmol/L Normal     22.0-30.0  [facility]  
   
                                        Comment on above:   Performed By: #### D  
DIM ####  
[facility]  
[address] [place], [place] [address]  
Khurram Arlene   
   
                      Creatinine [Mass/Vol] 1.05 mg/dL Normal     0.66-1.25  The  
 [facility]  
   
                                        Comment on above:   Performed By: #### D  
DIM ####  
[facility]  
[address] [id number]  
Khurram Arlene   
   
                      EGFR-AF AMERICAN >60        Normal     >=60       [facility]  
   
                                        Comment on above:   Performed By: #### D  
DIM ####  
[facility]  
[address] [place], [place] [address]  
Khurram Arlene   
   
                      EGFR-NON AF AMERICAN >60        Normal     >=60       The   
[facility]  
   
                                        Comment on above:   Performed By: #### D  
DIM ####  
[facility]  
[address] [place], [place] [address]  
Khurram Arlene   
   
                                                    Globulin (S)   
[Mass/Vol]      4.0 g/dL        Normal                          [facility]  
   
                                        Comment on above:   Performed By: #### D  
DIM ####  
[facility]  
[address] [place], [place] [address]  
Khurram Arlene   
   
                      Glucose [Mass/Vol] 99 mg/dL   Normal          [facility]  
   
                                        Comment on above:   Performed By: #### D  
DIM ####  
[facility]  
[address] [place], [place] [address]  
Khurram Arlene   
   
                      Potassium [Moles/Vol] 4.6 mmol/L Normal     3.4-5.0    [facility]  
   
                                        Comment on above:   Performed By: #### D  
DIM ####  
[facility]  
[address] [place], [place] [address]  
Khurram Arlene   
   
                      Protein [Mass/Vol] 8.5 g/dL   Critically high 6.1-8.2    T  
[facility]  
   
                                        Comment on above:   Performed By: #### D  
DIM ####  
[facility]  
[address] [id number]  
Khurram Arlene   
   
                      Sodium [Moles/Vol] 139 mmol/L Normal     137-145    [facility]  
   
                                        Comment on above:   Performed By: #### D  
DIM ####  
[facility]  
[address] [place], [place] [address]  
Khurram Jacobs   
   
                                                    Urea nitrogen   
[Mass/Vol]      16.0 mg/dL      Normal          9.0-20.0        [facility]  
   
                                        Comment on above:   Performed By: #### D  
DIM ####  
[facility]  
[address] [id number]  
Khurram Jacobs   
   
                                                    Urea   
nitrogen/Creatinine   
[Mass ratio]    15.2 mg/mg      Normal                          [facility]  
   
                                        Comment on above:   Performed By: #### D  
DIM ####  
[facility]  
[address] [address]  
Khurram Jacobs   
   
                                                    Reticulocyte Counton   
021   
   
                      Reticulocyte Number 0.089 10*6/uL High       0.024-0.084 F  
[facility]  
   
                                        Comment on above:   Result Comment: PERF  
ORMED BY:  
[address] [phone]  
PATHOLOGIST MEDICAL DIRECTOR  
ADE KENNY M.D.   
   
                                                            Performed By: #### S  
OFIANEG, COVID-19 CARLOS ENRIQUE ####  
[facility]  
[address] [place] [address]   
   
                      Reticulocyte Percent 2.2 %      High       0.5-1.5    [facility]  
   
                                        Comment on above:   Performed By: #### S  
OFIANEG, COVID-19 CARLOS ENRIQUE ####  
[facility]  
[address] [place] [address]   
   
                                                    Scan and CBCon 2021   
   
                      Additional Comments            Normal                [facility]  
   
                                        Comment on above:   Order Comment: REDRA  
W FOR PLATELT CLUMPS   
   
                                                            Result Comment: Ther  
e is significant microcytosis which suggests   
the  
possibility of iron deficiency.  
Consider serum ferritin and iron studies.  
PERFORMED BY:  
[facility]  
[address] [place] [address]  
[phone]  
PATHOLOGIST MEDICAL DIRECTOR  
ADE KENNY M.D.   
   
                                                            Performed By: #### S  
OFIANEG, COVID-19 CARLOS ENRIQUE ####  
[facility]  
[address]   
   
                      Anisocytosis Ql (Bld) Moderate   Normal                [facility]  
   
                                        Comment on above:   Order Comment: REDRA  
W FOR PLATELT CLUMPS   
   
                                                            Performed By: #### S  
OFWILLI COVID-19 CARLOS ENRIQUE ####  
[facility]  
[address]   
   
                                                    Basophils (Bld)   
[#/Vol]         0.0 10*3/uL     Normal          0.0-0.2         [facility]  
   
                                        Comment on above:   Order Comment: REDRA  
W FOR PLATELT CLUMPS   
   
                                                            Performed By: #### S  
OFWILLI COVID-19 CARLOS ENRIQUE ####  
[address]   
   
                                                    Basophils/100 WBC   
(Bld)           0.6 %           Normal          .               [facility]  
   
                                        Comment on above:   Order Comment: REDRA  
W FOR PLATELT CLUMPS   
   
                                                            Performed By: #### S  
OFWILLI COVID-19 CARLOS ENRIQUE ####  
[address]   
   
                                                    Eosinophils (Bld)   
[#/Vol]         0.0 10*3/uL     Normal          0.0-0.45        [facility]  
   
                                        Comment on above:   Order Comment: REDRA  
W FOR PLATELT CLUMPS   
   
                                                            Performed By: #### S  
OFWILLI COVID-19 CARLOS ENRIQUE ####  
[facility]  
[address] [place] [address]   
   
                                                    Eosinophils/100 WBC   
(Bld)           0.5 %           Normal          .               [facility]  
   
                                        Comment on above:   Order Comment: REDRA  
W FOR PLATELT CLUMPS   
   
                                                            Performed By: #### S  
OFWILLI COVID-19 CARLOS ENRIQUE ####  
[facility]  
[address] [place] [address]   
   
                                                    Erythrocyte   
distribution width   
(RBC) [Ratio]   18.0 %          High            12.0-14.8       [facility]  
   
                                        Comment on above:   Order Comment: REDRA  
W FOR PLATELT CLUMPS   
   
                                                            Performed By: #### S  
OFWILLI COVID-19 CARLOS ENRIQUE ####  
[facility]  
[address] [place] [address]   
   
                                                    Hematocrit (Bld)   
[Volume fraction] 25.1 %          Low             38.8-50.0       [facility]  
   
                                        Comment on above:   Order Comment: REDRA  
W FOR PLATELT CLUMPS   
   
                                                            Performed By: #### S  
SUE COVID-19 CARLOS ENRIQUE ####  
[facility]  
[address]   
   
                                                    Hemoglobin (Bld)   
[Mass/Vol]      7.1 g/dL        Low             13.0-17.0       [facility]  
   
                                        Comment on above:   Order Comment: REDRA  
W FOR PLATELT CLUMPS   
   
                                                            Performed By: #### S  
OFCHUYITAANGELA COVID-19 CARLOS ENRIQUE ####  
[facility]  
[address]   
   
                      Hypochromasia Marked     Normal                [facility]  
   
                                        Comment on above:   Order Comment: REDRA  
W FOR PLATELT CLUMPS   
   
                                                            Performed By: #### S  
OFWILLI COVID-19 CARLOS ENRIQUE ####  
[address]   
   
                                                    Lymphocytes (Bld)   
[#/Vol]         1.3 10*3/uL     Normal          1.00-4.8        [facility]  
   
                                        Comment on above:   Order Comment: REDRA  
W FOR PLATELT CLUMPS   
   
                                                            Performed By: #### S  
OFCHUYITAANGELA COVID-19 CARLOS ENRIQUE ####  
[address]   
   
                                                    Lymphocytes/100 WBC   
(Bld)           18.8 %          Normal          .               [facility]  
   
                                        Comment on above:   Order Comment: REDRA  
W FOR PLATELT CLUMPS   
   
                                                            Performed By: #### S  
OFCHUYITAANGELA, COVID-19 CARLOS ENRIQUE ####  
[address]   
   
                                                    MCH (RBC) [Entitic   
mass]           17.0 pg         Low             27.5-35.2       [facility]  
   
                                        Comment on above:   Order Comment: REDRA  
W FOR PLATELT CLUMPS   
   
                                                            Performed By: #### S  
OFCHUYITAANGELA COVID-19 CARLOS ENRIQUE ####  
[address]   
   
                                                    MCV (RBC) [Entitic   
vol]            60.2 fL         Low             83.5-101        [facility]  
   
                                        Comment on above:   Order Comment: REDRA  
W FOR PLATELT CLUMPS   
   
                                                            Performed By: #### S  
OFIANEG, COVID-19 CARLOS ENRIQUE ####  
[facility]  
[address]  
[place] [address] [place]   
   
                                                    Mean Corpuscular HGB   
Conc            28.2 g/dL       Low             32.5-35.6       [facility]  
   
                                        Comment on above:   Order Comment: REDRA  
W FOR PLATELT CLUMPS   
   
                                                            Performed By: #### S  
OFWILLI COVID-19 CARLOS ENRIQUE ####  
[facility]  
[address] [place] [address] [place]   
   
                      Microcytosis Marked     Normal                [facility]  
   
                                        Comment on above:   Order Comment: REDRA  
W FOR PLATELT CLUMPS   
   
                                                            Performed By: #### S  
OFWILLI COVID-19 CARLOS ENRIQUE ####  
[facility]  
[address] [place] [address] [place]   
   
                                                    Monocytes (Bld)   
[#/Vol]         0.4 10*3/uL     Normal          0.0-0.8         [facility]  
   
                                        Comment on above:   Order Comment: REDRA  
W FOR PLATELT CLUMPS   
   
                                                            Performed By: #### S  
OFWILLI COVID-19 CARLOS ENRIQUE ####  
[facility]  
[address] [place] [address] [place]   
   
                                                    Monocytes/100 WBC   
(Bld)           6.3 %           Normal          .               [facility]  
   
                                        Comment on above:   Order Comment: REDRA  
W FOR PLATELT CLUMPS   
   
                                                            Performed By: #### S  
OFWILLI COVID-19 CARLOS ENRIQUE ####  
[facility]  
[address] [place] [address] [place]   
   
                                                    Neutrophils (Bld)   
[#/Vol]         5.1 10*3/uL     Normal          1.8-7.7         [facility]  
   
                                        Comment on above:   Order Comment: REDRA  
W FOR PLATELT CLUMPS   
   
                                                            Performed By: #### S  
OFWILLI COVID-19 CARLOS ENRIQUE ####  
[facility]  
[address] [place] [address] [place]   
   
                                                    Neutrophils/100 WBC   
(Bld)           73.8 %          Normal          .               [facility]  
   
                                        Comment on above:   Order Comment: REDRA  
W FOR PLATELT CLUMPS   
   
                                                            Performed By: #### S  
OFWILLI COVID-19 CARLOS ENRIQUE ####  
[facility]  
[address] [place] [address] [place]   
   
                                                    Nucleated RBC/100 WBC   
(Bld) [Ratio]   0.1 %           Normal          0-0.5           [facility]  
   
                                        Comment on above:   Order Comment: REDRA  
W FOR PLATELT CLUMPS   
   
                                                            Performed By: #### S  
OFIANEG, COVID-19 CARLOS ENRIQUE ####  
[address]   
   
                      Ovalocytes Slight     Normal                [facility]  
   
                                        Comment on above:   Order Comment: REDRA  
W FOR PLATELT CLUMPS   
   
                                                            Performed By: #### S  
OFIANEG, COVID-19 CARLOS ENRIQUE ####  
[address]   
   
                      Platelet Estimate Normal     Normal     Normal     [facility]  
   
                                        Comment on above:   Order Comment: REDRA  
W FOR PLATELT CLUMPS   
   
                                                            Performed By: #### S  
OFIANEG, COVID-19 CARLOS ENRIQUE ####  
[address]   
   
                                                    Platelet mean volume   
(Bld) [Entitic vol] 8.4 fL          Normal          6.6-10.1        [facility]  
   
                                        Comment on above:   Order Comment: REDRA  
W FOR PLATELT CLUMPS   
   
                                                            Performed By: #### S  
OFIANEG, COVID-19 CARLOS ENRIQUE ####  
[address]   
   
                      Platelet Morphology Normal     Normal     Normal     [facility]  
   
                                        Comment on above:   Order Comment: REDRA  
W FOR PLATELT CLUMPS   
   
                                                            Result Comment: PERF  
ORMED BY:  
[place] [id number]  
[phone]  
PATHOLOGIST MEDICAL DIRECTOR  
ADE KENNY M.D.   
   
                                                            Performed By: #### S  
OFIANEG, COVID-19 CARLOS ENRIQUE ####  
[address]   
   
                                                    Platelets (Bld)   
[#/Vol]         283 10*3/uL     Normal          150-450         [facility]  
   
                                        Comment on above:   Order Comment: REDRA  
W FOR PLATELT CLUMPS   
   
                                                            Performed By: #### S  
OFIANEG, COVID-19 CARLOS ENRIQUE ####  
[address]   
   
                      Poikilocytosis Slight     Normal                [facility]  
   
                                        Comment on above:   Order Comment: REDRA  
W FOR PLATELT CLUMPS   
   
                                                            Performed By: #### S  
OFIANEG, COVID-19 CARLOS ENRIQUE ####  
[address]   
   
                      Polychromasia Slight     Normal                [facility]  
   
                                        Comment on above:   Order Comment: REDRA  
W FOR PLATELT CLUMPS   
   
                                                            Performed By: #### S  
OFWILLI COVID-19 CARLOS ENRIQUE ####  
[facility]  
[address] [place] [address]   
   
                      RBC (Bld) [#/Vol] 4.17 10*6/uL Normal     3.90-5.60  [facility]  
   
                                        Comment on above:   Order Comment: REDRA  
W FOR PLATELT CLUMPS   
   
                                                            Performed By: #### S  
OFWILLI COVID-19 CARLOS ENRIQUE ####  
[facility]  
[address] [place] [address]   
   
                      Tear Drop Cells Rare       Normal                [facility]  
   
                                        Comment on above:   Order Comment: REDRA  
W FOR PLATELT CLUMPS   
   
                                                            Performed By: #### S  
OFCHUYITAEG, COVID-19 CARLOS ENRIQUE ####  
[address]   
   
                      WBC (Bld) [#/Vol] 7.0 10*3/uL Normal     4.5-11.0   [facility]  
   
                                        Comment on above:   Order Comment: REDRA  
W FOR PLATELT CLUMPS   
   
                                                            Performed By: #### S  
OFWILLI, COVID-19 CARLOS ENRIQUE ####  
[facility]  
[address] [place] [address]   
   
                                                    Carlos Enrique Ag Negativeon 20  
21   
   
                      Carlos Enrique Ag Negative Negative   Normal     Negative   [facility]  
   
                                        Comment on above:   Result Comment: This  
 is a duplicate Carlos Enrique SARS Antigen (CELSO)   
result to be  
used for statistical tracking purpose only.  
PERFORMED BY:  
[place] [id number]  
[phone]  
PATHOLOGIST MEDICAL DIRECTOR  
ADE KENNY M.D.   
   
                                                            Performed By: #### S  
OFWILLI COVID-19 CARLOS ENRIQUE ####  
[facility]  
[address] [place] [address]   
   
                                                    Stool Occult Blood (Guaiac)o  
n 2021   
   
                                                    Stool Occult Blood   
(Guaiac)                                Occult Blood  
Negative for Occult   
Blood by Guaiac   
Methodology  
--------------------  
--------------------  
Reference range =   
Negative  
PERFORMED BY:  
[address] [place]  
[place] [address]  
[phone]  
PATHOLOGIST MEDICAL   
DIRECTOR  
ADE KENNY M.D.    Normal                                  [facility]  
   
                                        Comment on above:   Performed By: #### S  
MARTHAWILLI COVID-19 CARLOS ENRIQUE ####  
[facility]  
[address] [place], [place] [address] [place]   
   
                                                    TROPONIN, HIGH SENSITIVITYon  
 2021   
   
                      HSTROP     9.1 pg/mL  Normal     4.0-42.2   [facility]  
   
                                        Comment on above:   Result Comment: CUT-  
OFF POINTS HAVE BEEN ESTABLISHED BASED ON   
THE FOURTH UNIVERSAL DEFINITIONS OF MYOCARDIAL  
INFARCTION. THE UPPER REFERENCE LIMIT (URL) OF TROPONIN, DEFINED   
AS THE 99TH PERCENTILE OF  
cTnI DISTRIBUTION IN A REFERENCE POPULATION, HAS BEEN CONFIRMED   
AS THE DECISION THRESHOLD  
FOR MI DIAGNOSIS.   
   
                                                            Performed By: #### D  
DIM ####  
[facility]  
[address] [address]  
Khurram Jacobs   
   
                                                    Total Iron Binding Capacityo  
n 2021   
   
                                                    Total Iron Binding   
Capacity        563 ug/dL       High            255-450         [facility]  
   
                                        Comment on above:   Performed By: #### S  
MARTHAWLILI COVID-19 CARLOS ENRIQUE ####  
[facility]  
[address] [place] [address] [place]   
   
                                                    Transferrin   
[Mass/Vol]      402 mg/dL       High            180-380         [facility]  
   
                                        Comment on above:   Performed By: #### S  
SUE COVID-19 CARLOS ENRIQUE ####  
[facility]  
[address] [place], [place] [address] [place]   
   
                                                    Troponin I(TnI)on 2021  
   
   
                                                    Troponin I.cardiac   
[Mass/Vol]      ng/mL           Normal          0-0.02          [facility]  
   
                                        Comment on above:   Result Comment: PEGGY   
MI Cut off value > or equal to 0.03 ng/mL   
in   
conjunction  
with clinical conditions of myocardial infarction.  
(www.escardio.org/guidelines)  
PERFORMED BY:  
[address] [place] [address]  
[phone]  
PATHOLOGIST MEDICAL DIRECTOR  
ADE KENNY M.D.   
   
                                                            Performed By: #### B  
NP, CKMB, CMP, TROP, CK ####  
[facility]  
[address] [place], [place] [id number] [facility]   
   
                                                    Type and Screenon 2021  
   
   
                                                    ABO and Rh group Nom   
(Bld)                                   Blood group A Rh(D)   
positive            Normal                                  [facility]  
   
                                        Comment on above:   Result Comment: PERF  
ORMED BY:  
[place] [id number]  
[phone]  
PATHOLOGIST MEDICAL DIRECTOR  
ADE KNENY M.D.   
   
                                                    Vit. B12/Folate Profileon    
   
                                                    Cobalamin (Vitamin   
B12) [Mass/Vol] 255 pg/mL       Normal          180-914         [facility]  
   
                                        Comment on above:   Performed By: #### S  
JU ROGERS-19 CARLOS ENRIQUE ####  
[address]   
   
                      Folate     21.6 ng/mL Normal     >5.9       [facility]  
   
                                        Comment on above:   Result Comment: Kassidy  
te reference range: >5.9 ng/ml  
The WHO technical consultation on folate and vitamin b12  
deficiencies has determined that folate concentrations less  
than 4 ng/ml are considered deficient.  
PERFORMED BY:  
[place] [id number]  
[phone]  
PATHOLOGIST MEDICAL DIRECTOR  
ADE KENNY M.D.   
   
                                                            Performed By: #### S  
JU ROGERS-19 CARLOS ENRIQUE ####  
[place] [id number] [facility]   
   
                                                    XR chest 1V portableon    
   
                                        XR chest 1V portable [facility]  
[address] [place] [address]  
  
XRay Report  
Signed  
  
Patient:   
Harish Sargent MR#:   
[id number]  
[id number]  
: 1970   
Acct:[id number]  
  
Age/Sex: 51 / M ADM   
Date: 21  
  
Loc:  Room:   
[address] [place] [address] Type: DIS   
INOo  
Attending Dr: Valente Ventrua MD  
  
Ordering Provider:   
Christiano Card MD  
Date of Service:   
21  
Accession #:   
([id number]) XR/XR   
chest 1V portable:   
Shortness of   
Breath/Dyspnea  
  
  
Copies to: MD Christiano Guadalupe MD  
  
  
PORTABLE AP ERECT   
CHEST 1848 hours  
  
CLINICAL HISTORY:   
Chest pain and   
shortness of breath.   
Anemia.  
  
COMPARISON: None  
  
The heart is within   
normal limits. There   
is no vascular   
congestion. No   
consolidation is   
seen.  
There is no effusion   
or pneumothorax. The   
osseous structures   
are intact.  
  
  
ORDER #: [id number]   
XR/XR chest 1V   
portable  
IMPRESSION:  
  
NO ACUTE FINDINGS  
  
Impression dictated   
by: Arlene Salomon M.D.2021 4:22 PM  
  
  
Dictation Location:   
[place] [address]  
  
  
  
Transcribed By: [facility]   
21 162  
Dictated By: Arlene Salomon MD 21   
162  
  
Signed By:  
21 1622       Normal                                  [facility]  
  
  
  
Vital Signs  
  
  
                          Date Time    Vital Sign   Value        Performing   
Clinician                               Facility  
   
                                                    10-   
10:                              Body mass index (BMI)   
[Ratio]             32.1 kg/m2                              [facility]  
   
                                                    10-   
10:      Body temperature 97.9 [degF]                     [facility]  
   
                                                    10-   
10:                              Diastolic blood   
pressure            82 mm[Hg]                               [facility]  
   
                                                    10-   
10:      Heart rate      87 /min                         [facility]  
   
                                                    10-   
10:                              SaO2% (BldA) [Mass   
fraction]           98 %                                    [facility]  
   
                                                    10-   
10:                              Systolic blood   
pressure            128 mm[Hg]                              [facility]  
   
                                                    10-   
09:      Body height     179.07 cm                       [facility]  
   
                                                    10-   
09:      Body weight     102.96 kg                       [facility]  
   
                                                    2024   
09:          Body height         175.3 cm            Chaka Herman MD  
Work Phone:   
[phone]                          [facility]  
   
                                                    2024   
09:                              Body mass index (BMI)   
[Ratio]                   31.86 kg/m2               Chaka Herman MD  
Work Phone:   
[phone]                          [facility]  
   
                                                    2024   
09:          Body temperature    97.39 [degF]        Chaka Herman MD  
Work Phone:   
[phone]                          [facility]  
   
                                                    2024   
09:          Body weight         97.9 kg             Chaka Herman MD  
Work Phone:   
[phone]                          [facility]  
   
                                                    2024   
09:                              Diastolic blood   
pressure                  86 mm[Hg]                 Chaka Herman MD  
Work Phone:   
[phone]                          [facility]  
   
                                                    2024   
09:          Heart rate          91 /min             Chaka Herman MD  
Work Phone:   
[phone]                          [facility]  
   
                                                    2024   
09:          Respiratory rate    16 /min             Chaka Herman MD  
Work Phone:   
[phone]                          [facility]  
   
                                                    2024   
09:                              SaO2% (BldA) [Mass   
fraction]                 96 %                      Chaka Herman MD  
Work Phone:   
[phone]                          [facility]  
   
                                                    2024   
09:                              Systolic blood   
pressure                  134 mm[Hg]                Chaka Herman MD  
Work Phone:   
[phone]                          [facility]  
   
                                                    2022   
13:          Body height         175.4 cm            Chaka Herman MD  
Work Phone:   
[phone]                          [facility]  
   
                                                    2022   
13:          Body temperature    97.81 [degF]        Chaka Herman MD  
Work Phone:   
[phone]                          [facility]  
   
                                                    2022   
13:          Body weight         101.79 kg           Chaka Herman MD  
Work Phone:   
[phone]                          [facility]  
   
                                                    2022   
13:                              Diastolic blood   
pressure                  78 mm[Hg]                 Chaka Herman MD  
Work Phone:   
[phone]                          [facility]  
   
                                                    2022   
13:          Heart rate          91 /min             Chaka Herman MD  
Work Phone:   
[phone]                          [facility]  
   
                                                    2022   
13:          Respiratory rate    16 /min             Chaka Herman MD  
Work Phone:   
[phone]                          [facility]  
   
                                                    2022   
13:                              SaO2% (BldA) [Mass   
fraction]                 99 %                      Chaka Herman MD  
Work Phone:   
[phone]                          [facility]  
   
                                                    2022   
13:040400                              Systolic blood   
pressure                  123 mm[Hg]                Chaka Herman MD  
Work Phone:   
[phone]                          [facility]  
  
  
  
Encounters  
  
  
                          Encounter Date Encounter Type Care Provider Facility  
   
                          Start: 10- Patient encounter status              
  [facility]  
   
                                                    Start: 10-  
End: 10-     ambulatory                              [facility]  
Work Phone:   
[phone]  
   
                                                    Start: 10-  
End: 10-                         Encounter for general adult   
medical examination without   
abnormal findings                                   [facility]  
   
                                                    Start: 10-  
End: 10-     Patient encounter procedure                     [facility]  
ysician   
[facility]  
Work Phone:   
[phone]  
   
                                                    Start: 2024  
End: 2024                         Office outpatient visit 25   
minutes                                 Chaka Herman MD  
Work Phone:   
[phone]                          Hematology/Oncology  
   
                                        Comment on above:   Chronic anticoagulat  
ion (Primary Dx);  
Deep vein thrombosis (DVT) of proximal vein of both lower   
extremities, unspecified chronicity (HCC);  
Iron deficiency anemia due to chronic blood loss   
   
                                                    Start: 2024  
End: 2024     ambulatory          SHARRON  JUANITA      Facility:[facility]  
   
                          Start: 2024 Telephone encounter Beau SAAB  
ematology/Oncology  
   
                                        Comment on above:   Lab Orders   
   
                                                    Start: 2023  
End: 2023     ambulatory          SHARRON GRANT      Facility:[facility]  
   
                                Start: 2022 Refill          Nicole sandoval   
[facility]  
Work Phone:   
[phone]                          Hematology/Oncology  
   
                                        Comment on above:   Refill Request   
   
                                                    Start: 2022  
End: 2022                         Anticoagulant drug   
monitoring                              Chaka Herman MD  
Work Phone:   
[phone]                          Hematology/Oncology  
   
                                        Comment on above:   Iron deficiency anem  
ia due to chronic blood loss (Primary Dx);  
Poor iron absorption;  
Chronic anticoagulation;  
Pulmonary embolus with infarction (HCC);  
Deep vein thrombosis (DVT) of proximal vein of both lower   
extremities, unspecified chronicity (HCC)   
   
                                                    Start: 2022  
End: 2022                         Telemedicine consultation   
with patient                            Chaka Herman MD  
Work Phone:   
[phone]                          [facility]  
   
                                                    Start: 2022  
End: 2022           ambulatory                Chaka Herman MD  
Work Phone:   
[phone]                          Hematology/Oncology  
   
                                        Comment on above:   Iron deficiency anem  
ia due to chronic blood loss (Primary Dx);  
Poor iron absorption   
   
                                                    Start: 2022  
End: 2022           Patient encounter procedure Chaka Herman MD  
Work Phone:   
[phone]                          [facility]  
   
                                Start: 2022 Telephone encounter Nicole Cash   
[facility]  
Work Phone:   
[phone]                          Hematology/Oncology  
   
                                        Comment on above:   Medication Assistanc  
e Program ([employer])   
   
                                Start: 2022 Telephone encounter Financial   
Navigator   
Michael  
Work Phone:   
[phone]                          Hematology/Oncology  
   
                                        Comment on above:   Benefits Investigati  
on   
   
                                Start: 2022 Telephone encounter Katy auguste [facility]  
Work Phone:   
[phone]                          Hematology/Oncology  
   
                                        Comment on above:   Medication Follow-up  
 ([employer] copay program)   
   
                                Start: 2021 Chart abstracting Chaka mendez MD  
Work Phone:   
[phone]                          Hematology/Oncology  
   
                                                    Start: 2021  
End: 2021     ambulatory          DR SHARRON GRANT     Facility:[facility]  
   
                                                    Start: 2021  
End: 2021     ambulatory          DR SHARRON GRANT     Facility:[facility]  
   
                                                    Start: 2021  
End: 2021     ambulatory          DR SHARRON GRANT     Facility:[facility]  
  
  
  
Procedures  
  
  
                          Date         Procedure    Procedure Detail Performing   
Clinician  
   
                          Start: 2021 Antibody screen                
   
                                        Comment on above:   Result Comment: PERF  
ORMED BY:  
[facility]  
[address] [place] [address]  
[phone]  
PATHOLOGIST MEDICAL DIRECTOR  
ADE KENNY M.D.   
  
  
  
Plan of Treatment  
  
  
                          Date         Care Activity Detail       Author  
   
                          Start: 2027 Diabetes Screening Diabetes Screenin  
g [facility]  
   
                          Start: 2026 Diabetes Screening Diabetes Screenin  
g [facility]  
   
                          Start: 2025 DIABETES SCREEN DIABETES SCREEN [facility]  
   
                          Start: 2025 DIABETES SCREEN DIABETES SCREEN [facility]  
   
                          Start: 2025 DIABETES SCREEN DIABETES SCREEN [facility]  
   
                                                    Start: 2025  
End: 2025           Follow-up encounter       2025 8:30 AM EST   
Visit (SP) Office   
Hematology/Oncology [address]   
[facility] [place] [address] [phone]   
Chaka Herman MD [address] [place] [address] [place] [address] [phone]   
(Work) [phone] (Fax)   
6 month follow up, labs 1   
week prior **pt needs   
this before work**                      Hematology/Oncology  
   
                                        Comment on above:   6 month follow up, l  
abs 1 week prior **pt needs this before   
work**   
   
                                                    Start: 2025  
End: 2025                         Patient encounter   
procedure                               2025 8:30 AM EST   
Office Visit [facility]   
Laboratory [address] [place] [address] [place]   
[address] [phone] lab                  [facility]  
   
                                        Comment on above:   lab   
   
                                                    Start: 2025  
End: 2025                         CBC W Auto Differential   
panel - Blood                           COMPLETE BLOOD COUNT AND   
DIFFERENTIAL Lab Routine   
Chronic anticoagulation   
Deep vein thrombosis   
(DVT) of proximal vein of   
both lower extremities,   
unspecified chronicity   
(HCC) Iron deficiency   
anemia due to chronic   
blood loss Expected:   
2025 (Approximate),   
Expires: 2025                     [facility]  
Work Phone:   
[phone]  
   
                                        Comment on above:   Expected: 2025  
 (Approximate), Expires: 2025   
   
                                                    Start: 2025  
End: 2025                         Cobalamin (Vitamin B12)   
[Mass/volume] in Serum   
or Plasma                               VITAMIN B12 Lab Routine   
Chronic anticoagulation   
Deep vein thrombosis   
(DVT) of proximal vein of   
both lower extremities,   
unspecified chronicity   
(HCC) Iron deficiency   
anemia due to chronic   
blood loss Expected:   
2025 (Approximate),   
Expires: 2025                     [facility]  
   
                                        Comment on above:   Expected: 2025  
 (Approximate), Expires: 2025   
   
                                                    Start: 2025  
End: 2025                         Comprehensive metabolic   
2000 panel - Serum or   
Plasma                                  COMPREHENSIVE METABOLIC   
PANEL Lab Routine Chronic   
anticoagulation Deep vein   
thrombosis (DVT) of   
proximal vein of both   
lower extremities,   
unspecified chronicity   
(HCC) Iron deficiency   
anemia due to chronic   
blood loss Expected:   
2025 (Approximate),   
Expires: 2025                     [facility]  
   
                                        Comment on above:   Expected: 2025  
 (Approximate), Expires: 2025   
   
                                                    Start: 2025  
End: 2025                         Ferritin [Mass/volume]   
in Serum or Plasma                      FERRITIN Lab Routine   
Chronic anticoagulation   
Deep vein thrombosis   
(DVT) of proximal vein of   
both lower extremities,   
unspecified chronicity   
(HCC) Iron deficiency   
anemia due to chronic   
blood loss Expected:   
2025 (Approximate),   
Expires: 2025                     [facility]  
   
                                        Comment on above:   Expected: 2025  
 (Approximate), Expires: 2025   
   
                                                    Start: 2025  
End: 2025                         Folate [Mass/volume] in   
Serum or Plasma                         FOLATE, SERUM Lab Routine   
Chronic anticoagulation   
Deep vein thrombosis   
(DVT) of proximal vein of   
both lower extremities,   
unspecified chronicity   
(HCC) Iron deficiency   
anemia due to chronic   
blood loss Expected:   
2025 (Approximate),   
Expires: 2025                     [facility]  
   
                                        Comment on above:   Expected: 2025  
 (Approximate), Expires: 2025   
   
                                                    Start: 2025  
End: 2025                         Iron and Iron binding   
capacity panel - Serum   
or Plasma                               IRON AND TIBC Lab Routine   
Chronic anticoagulation   
Deep vein thrombosis   
(DVT) of proximal vein of   
both lower extremities,   
unspecified chronicity   
(HCC) Iron deficiency   
anemia due to chronic   
blood loss Expected:   
2025 (Approximate),   
Expires: 2025                     [facility]  
   
                                        Comment on above:   Expected: 2025  
 (Approximate), Expires: 2025   
   
                          Start: 2024 DIABETES SCREEN DIABETES SCREEN Clev  
[facility]  
   
                          Start: 2024 Influenza vaccination              C  
[facility]  
   
                                                    Start: 2024  
End: 2025                         CBC W Auto Differential   
panel - Blood                           COMPLETE BLOOD COUNT AND   
DIFFERENTIAL Lab Routine   
Chronic anticoagulation   
Pulmonary embolus with   
infarction (HCC) Deep   
vein thrombosis (DVT) of   
proximal vein of both   
lower extremities,   
unspecified chronicity   
(HCC) Poor iron   
absorption Expected:   
2024 (Approximate),   
Expires: 2025                     [facility]  
Work Phone:   
[phone]  
   
                                        Comment on above:   Expected: 2024  
 (Approximate), Expires: 2025   
   
                                                    Start: 2024  
End: 2025                         Comprehensive metabolic   
2000 panel - Serum or   
Plasma                                  COMPREHENSIVE METABOLIC   
PANEL Lab Routine Chronic   
anticoagulation Pulmonary   
embolus with infarction   
(HCC) Deep vein   
thrombosis (DVT) of   
proximal vein of both   
lower extremities,   
unspecified chronicity   
(HCC) Poor iron   
absorption Expected:   
2024 (Approximate),   
Expires: 2025                     [facility]  
   
                                        Comment on above:   Expected: 2024  
 (Approximate), Expires: 2025   
   
                                                    Start: 2024  
End: 2024           Follow-up encounter       2024 1:45 PM EDT   
Visit (SP) Office   
Hematology/Oncology [address]   
[facility] [place] [address] [phone]   
Chaka Herman    
[facility] [place] [address] [phone]   
(Work) [phone] (Fax)   
1 YEAR FOLLOW UP with lab               Hematology/Oncology  
   
                                        Comment on above:   1 YEAR FOLLOW UP wit  
h lab   
   
                                                    Start: 2024  
End: 2024                         Patient encounter   
procedure                               2024 1:30 PM EDT   
Office Visit [facility]   
Laboratory [address] [facility] [place] [facility]   
[address] [phone] lab 1   
YEAR FOLLOW UP with lab                 [facility]  
   
                                        Comment on above:   lab 1 YEAR FOLLOW UP  
 with lab   
   
                                        Start: 2024   Prostate specific   
antigen measurement                     Prostate Cancer Screening   
Discussion                              [facility]  
   
                                        Start: 2024   Behavioral Health   
Screening                               Behavioral Health   
Screening                               [facility]  
   
                                        Start: 2023   Covid-19 Vaccine ( season)                         Covid-19 Vaccine ( season)                         [facility]  
   
                                                    Start: 2023  
End: 2023                         CBC W Auto Differential   
panel - Blood                           CBC + DIFF Lab Routine   
Iron deficiency anemia   
due to chronic blood loss   
Poor iron absorption   
Expected: 2023   
(Approximate), Expires:   
2023                              [facility]  
Work Phone:   
[phone]  
   
                                        Comment on above:   Expected: 2023  
 (Approximate), Expires: 2023   
   
                                                    Start: 2023  
End: 2023                         Cobalamin (Vitamin B12)   
[Mass/volume] in Serum   
or Plasma                               VITAMIN B12 BLOOD Lab   
Routine Iron deficiency   
anemia due to chronic   
blood loss Poor iron   
absorption Expected:   
2023 (Approximate),   
Expires: 2023                     [facility]  
Work Phone:   
[phone]  
   
                                        Comment on above:   Expected: 2023  
 (Approximate), Expires: 2023   
   
                                                    Start: 2023  
End: 2023                         Comprehensive metabolic   
2000 panel - Serum or   
Plasma                                  COMP METABOLIC PANEL Lab   
Routine Iron deficiency   
anemia due to chronic   
blood loss Poor iron   
absorption Expected:   
2023 (Approximate),   
Expires: 2023                     [facility]  
Work Phone:   
[phone]  
   
                                        Comment on above:   Expected: 2023  
 (Approximate), Expires: 2023   
   
                                                    Start: 2023  
End: 2023                         Ferritin [Mass/volume]   
in Serum or Plasma                      FERRITIN BLD Lab Routine   
Iron deficiency anemia   
due to chronic blood loss   
Poor iron absorption   
Expected: 2023   
(Approximate), Expires:   
2023                              [facility]  
Work Phone:   
[phone]  
   
                                        Comment on above:   Expected: 2023  
 (Approximate), Expires: 2023   
   
                                                    Start: 2023  
End: 2023                         Folate [Mass/volume] in   
Serum or Plasma                         FOLATE SERUM Lab Routine   
Iron deficiency anemia   
due to chronic blood loss   
Poor iron absorption   
Expected: 2023   
(Approximate), Expires:   
2023                              [facility]  
Work Phone:   
[phone]  
   
                                        Comment on above:   Expected: 2023  
 (Approximate), Expires: 2023   
   
                                                    Start: 2023  
End: 2023                         Iron and Iron binding   
capacity panel - Serum   
or Plasma                               IRON + TIBC Lab Routine   
Iron deficiency anemia   
due to chronic blood loss   
Poor iron absorption   
Expected: 2023   
(Approximate), Expires:   
2023                              [facility]  
Work Phone:   
[phone]  
   
                                        Comment on above:   Expected: 2023  
 (Approximate), Expires: 2023   
   
                                        Start: 2023   Urine microalbumin   
profile                                             [facility]  
   
                          Start: 2022 Influenza vaccination              [facility]  
   
                          Start: 2022 DEPRESSION ASSESSMENT DEPRESSION ASS  
ESSMENT [facility]  
   
                          Start: 2021 Influenza vaccination INFLUENZA (#1)  
 [facility]  
   
                                        Start: 2019   SHINGRIX VACCINE (1   
of   
2)                        SHINGRIX VACCINE (1 of 2) [facility]  
   
                          Start: 2014 COLOGUARD (FIT-DNA) COLOGUARD (FIT-D  
NA) [facility]  
   
                          Start: 2014 Colonoscopy  COLONOSCOPY  [facility]  
   
                                        Start: 2014   COLORECTAL CANCER   
SCREENING                               COLORECTAL CANCER   
SCREENING                               [facility]  
   
                          Start: 2014 CT COLONOGRAPHY CT COLONOGRAPHY [facility]  
   
                          Start: 2014 FECAL OCCULT BLOOD FECAL OCCULT BLOO  
D [facility]  
   
                                        Start: 2014   Screening for malign  
ant   
neoplasm of colon                                   [facility]  
   
                          Start: 2014 SIGMOIDOSCOPY SIGMOIDOSCOPY [facility]  
   
                                        Start: 2013   HEPATITIS B (3 of 3   
-   
Hep B Twinrix 3-dose   
series)                                 HEPATITIS B (3 of 3 - Hep   
B Twinrix 3-dose series)                [facility]  
   
                                        Start: 2013   Hepatitis B Vaccine   
(3   
of 3 - Hep B Twinrix   
3-dose series)                          Hepatitis B Vaccine (3 of   
3 - Hep B Twinrix 3-dose   
series)                                 [facility]  
   
                          Start: 2004 Lipid panel  Lipid Screening [facility]  
   
                          Start: 2004 LIPID SCREEN LIPID SCREEN [facility]  
   
                          Start: 1987 HEPATITIS C SCREENING HEPATITIS C [facility]  
   
                          Start: 1987 Hepatitis C screening Hepatitis C [facility]  
   
                          Start: 1987 HIV SCREENING HIV SCREENING [facility]  
   
                          Start: 1987 HIV screening HIV Screening [facility]  
   
                                        Start: 1981   Adult depression   
screening assessment      DEPRESSION SCREENING      [facility]  
   
                          Start: 1981 COVID-19 VACCINE (1) COVID-19 VACCIN  
E (1) [facility]  
   
                          Start: 1974 COVID-19 VACCINE (#1) COVID-19 VACCI  
NE (#1) [facility]  
   
                          Start: 1974 COVID-19 VACCINE (1) COVID-19 VACCIN  
E (1) [facility]  
   
                          Start: 1969 COVID-19 VACCINE (#1) COVID-19 VACCI  
NE (#1) [facility]  
   
                                                      
End: 2023                         CBC W Auto Differential   
panel - Blood                           CBC + DIFF Lab Routine   
Iron deficiency anemia   
due to chronic blood loss   
Poor iron absorption   
Every 6 months for 2   
Occurrences starting   
2022 until   
2023                              [facility]  
Work Phone:   
[phone]  
   
                                        Comment on above:   Every 6 months for 2  
 Occurrences starting 2022 until   
2023   
   
                                                      
End: 2023                         Comprehensive metabolic   
2000 panel - Serum or   
Plasma                                  COMP METABOLIC PANEL Lab   
Routine Iron deficiency   
anemia due to chronic   
blood loss Poor iron   
absorption Every 6 months   
for 2 Occurrences   
starting 2022 until   
2023                              [facility]  
Work Phone:   
[phone]  
   
                                        Comment on above:   Every 6 months for 2  
 Occurrences starting 2022 until   
2023   
   
                                                                 [facility]  
c  
   
                                                                 [facility]  
c  
   
                                                                 [facility]  
  
  
  
Immunizations  
  
  
                      Immunization Date Immunization Notes      Care Provider Fa  
cili  
   
                                        2013          hepatitis A and   
hepatitis B vaccine                                 Chaka Herman MD  
Work Phone:   
[phone]                          [facility]  
   
                                        2013          hepatitis B vaccine,  
   
unspecified formulation                             Chaka Herman MD  
Work Phone:   
[phone]                          [facility]  
   
                                        2013          hepatitis A and   
hepatitis B vaccine                                 Chaka Herman MD  
Work Phone:   
[phone]                          [facility]  
   
                                        2013          poliovirus vaccine,   
inactivated                                         Chaka Herman MD  
Work Phone:   
[phone]                          [facility]  
   
                                        2013          tetanus toxoid, redu  
richi   
diphtheria toxoid, and   
acellular pertussis   
vaccine, adsorbed                                   Chaka Herman MD  
Work Phone:   
[phone]                          [facility]  
   
                                        2013          poliovirus vaccine,   
inactivated                                         Chaka Herman MD  
Work Phone:   
[phone]                          [facility]  
  
  
  
Payers  
  
  
                          Date         Payer Category Payer        Policy ID  
   
                                2024      Private Health Insurance [facility]   
CHOICE PLUS [id number]   
2024-Present   
[phone] [address]   
[address] [place]   
[address] HMO                          [id number]  
[id number]  
   
                          2024   Unknown                   [id number]  
   
                                2015      Unknown         MMO MMO SUPERMED  
 PLUS   
[id number]   
2015-Present   
[phone] [address]   
[place] [address]   
PPO                                     [id number]   
[id number]  
[id number]  
   
                          2015   Unknown                   [id number]  
[id number]  
[id number]  
   
                          1969   Unknown                   [id number]   
[id number]  
[id number]  
   
                          1969   Unknown                   [id number]   
[id number]  
[id number]  
   
                          1969   Unknown                   [id number]   
[id number]  
[id number]  
   
                          1960   Unknown                   [id number]  
   
                                       Self-pay     Self Pay     [id number]  
[phone]  
[id number]  
  
  
  
Social History  
  
  
                          Date         Type         Detail       Facility  
   
                                                    Start: 2021  
End: 10-                         Tobacco smoking status   
NHIS                      Never smoked tobacco      [facility]  
   
                                        Start: 2021   Tobacco use and   
exposure                                Smokeless tobacco   
non-user                                [facility]  
   
                          Start: 1970 Sex Assigned At Birth Not on file  C  
[facility]  
   
                                                    Start: 2022  
End: 2022                         Exposure to SARS-CoV-2   
(event)                   Not sure                  [facility]  
   
                                                    Start: 2022  
End: 2023                         History of Social   
function                                            [facility]  
   
                                                    Start: 2022  
End: 2023     Tobacco use panel                       [facility]  
   
                                                            National Score   
(1-100), lower number   
is lower risk             78                        [facility]  
   
                          Start: 1970 Sex Assigned At Birth Male         F  
[facility]  
  
  
  
Medical Equipment  
  
  
                                Procedure Code  Equipment Code  Equipment Origin  
al   
Text                                    Equipment   
Identifier                              Dates  
   
                                                    Capsule endoscopy,   
for patency of   
lumen evaluation                                    Video capsule   
endoscopy system                        [id number]  
[id number]  
[id number]                       Start:   
2021  
  
  
  
Clinical Notes 2022 to 2024  
   Patient InstructionsAbChaka doshi MD - 2024 9:00 AM EDTTelephone   
Encounter - Beau Mayorga MA - 2024 10:08 AM EDTTelephone Encounter - 
Beau Mayorga MA - 2024 10:08 AM EDT  
  
                                Note Date & Type Note            Facility  
   
                                        2024 Instructions   
  
  
Julia Yang - 2024   
9:31 AM EDTFormatting of this   
note might be different from the   
original.  
Labs, including iron studies, in   
6 months  
RTC 1 week after  
Continue anticoagulation given   
massive PE  
Electronically signed by   
Julia Yang at 2024   
9:31 AM EDT  
  
documented in this encounter            [facility]  
   
                                                    2024 History of Presen  
t   
illness Narrative                       Formatting of this note is   
different from the original.  
Images from the original note   
were not included.  
NAME: Harish Sargent  
[facility] NO.: [id number]  
DATE OF SERVICE: 2024   
(Ishmael)  
  
Some elements in this clinic   
note that are critical to   
medical decision making have   
been carefully reviewed and   
included from a prior clinic   
note dated: May 12, 2023   
(Ishmael)  
  
Referring Provider: Sharron Grant  
  
Additional Clinicians involved   
in Harish Sargent's care:  
  
DIAGNOSIS:  
Iron deficiency anemia  
  
ASSESSMENT: 54 year old man with   
significant anemia that is   
likely chronic and related to   
prior GI bleed. He does not   
appear to have additional   
bleeding although he is now   
anticoagulated since 2021 for   
saddle embolus and left lower   
extremity DVT. He is very   
energetic and doesn't appear to   
have any decrease in exercise   
tolerance indicating an adapted   
state to his anemia. He also has   
a healthy diet with plenty of   
iron consumption indicating a   
likely malabsorptive condition   
to iron in addition to his prior   
loss of blood. His   
hypercoagulable workup was   
negative.  
There does not appear to be an   
underlying malignant process.  
  
Great response to iron. However   
several months later I suspect   
that he has underlying   
malabsorption in addition to   
chronic blood loss. This appears   
to have resolved.  
  
Suspect elevated creatinine is   
dependent on how well he   
hydrates. May need further   
workup on return.  
Continue Eliquis for now (PE -   
symptoms have resolved)  
  
PLAN:  
Labs, including iron studies, in   
6 months  
RTC 1 week after  
Continue anticoagulation given   
massive PE  
  
________________________________  
________________________________  
_______________________  
HPI:  
CASE HISTORY: Reverse   
Chronological Order  
2023 - CBC 6.77 >   
16.1/49.1 < 250, CMP normal,   
Iron 135, TIBC 448, transferrin   
saturation 30.1%, ferritin 20,   
B12 431, folate 13.3  
2022 - 2nd dose Monoferric  
2022 - Volume associated   
renal failure / ATN - corrects   
with oral hydration  
2021 - Monoferric  
2021 - Persisting microcytic   
anemia  
10/2021 - Hypercoagulable   
evaluation negative  
2021 - Released from   
[facility] after   
Heparinization and long term   
anticoagulation. Refused   
Hematology consult.  
2021 - Lower extremity   
doppler + Left lower ext DVT of   
dist Femoral, popliteal and   
peroneal veins  
2021 - Bilateral PE and   
saddle embolus on CTA. Mildly   
prominent hilar and mediastinal   
nodes noted.  
2021 - CT Abdomen   
unremarkable except left   
hydronephrosis due to chronic   
UPJ obstruction  
2021 - Dr. Araiza repeat   
EGD - erosive esophagitis,   
hiatal hernia, no Lee's.  
2021 - ER evaluation for   
fatigue and dyspnea severe   
anemia - found to have   
esophageal ulcer  
  
  
Updated Visit, 2024:  
Harish returns today for a follow   
up. 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   
is a supervisor at [employer]. He does have   
significant exposure to lead, he   
endorses wearing PPE. He now   
takes zinc, vitamin C, and iron   
supplements on top of Eliquis.   
He no longer takes omeprazole as   
his GERD has improved. His CBC   
remains normal from my   
perspective.  
  
Updated Visit, May 12, 2023:  
Harish continues to do very well.   
H/H completely normal now.   
Unsure what the cause of his   
initial loss and anemia was but   
likely had chronic bleeding from   
erosive esophagitis.  
  
Updated Visit, 2022: Telephone only for 5   
minutes  
Called Harish as requested. He   
feels fantastic. Still on   
anticoagulation. CBC remains   
corrected with hgb 16. Will   
discuss d/c'ing anticoagulation   
at the next visit.  
  
Updated Visit, May 13, 2022:  
Harish returns and feels great -   
his H/H has recovered fully. He   
remains on anticoagulation but   
is interested in stopping. Given   
the severity of his thrombotic   
events, I would recommend that   
he stay on for at tleast 1 year.   
He believes that COVID-19 was   
the cause of his DVT and   
pulmonary emboli. No other   
illiciting factors have been   
discovered.  
  
Updated Visit, 2022:  
Continued improvement in   
Hgb/Hct.  
Still has microcytic indices.  
Still feels that he has no   
symptoms  
Unaware of kidney dysfunction -   
corrected after increasing H2O   
untake but now is worse again.  
May need renal workup, but   
patient would prefer to try and   
hydrate on his own and then   
reconsider.  
Will continue iron infusion   
today.  
  
Updated Visit, 2022:  
Impressive improvement in H/H   
with iron infusion. CMP wasn't   
back until after he left. Will   
need to repeat next week to   
ensure he doesn't have ARF. He   
has no signs or symptoms of CHF.   
Will anticipate anticoagulation   
for 1 year and then consider   
discontinuing.  
  
Initial Visit, 2021:  
Harish Sargent presents today   
Hematology and Oncology   
evaluation. He is a 51 year old   
male who is very energetic and   
makes a living as a farmer,   
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   
busy with livestock, chores etc.   
That require heavy exertion.  
  
In 2021 he become fairly   
sluggish and couldn't get his   
chores done in his typical rapid   
manner and he was found to be   
significantly anemic. CBC:   
7>7.1/25.1<283. RBC indices were   
microcytic. He did not report   
any tarry stools.  
  
Repeat EGD in 2021 showed the   
ulcer was healed.  
  
He then developed an un-provoked   
DVT left leg and subsequent   
bilateral PE with saddle embolus   
causing acute right heart   
strain, improved quickly with   
heparinization. He had CT scans   
that didn't show any significant   
evidence of malignancy and his   
COVID-19 testing was negative.   
He remains un-vaccinated.  
  
He was not initially interested   
in a hematology consultation   
with respect to unprovoked   
thrombosis but is now presenting   
on kind referral from Dr. Grant   
for persisting anemia.  
  
His most recent labs indicate   
significant iron deficiency with   
microcytic anemia.  
  
________________________________  
________________________________  
_______________________  
REVIEW OF SYSTEMS  
Per HPI and otherwise negative   
by full review of organ systems.  
________________________________  
________________________________  
_______________________  
ECOG PERFORMANCE STATUS: 0  
PHYSICAL EXAMINATION:  
Vitals: /86   Pulse 91     
Temp (Src) 97.4 (Temporal)     
Resp 16   Ht 5' 9.016  (1.75m)     
Wt 215 lb 13.3 oz (97.9kg)     
SpO2 96%   BMI 31.86 kg/(m^2).  
  
Body surface area is 2.18 meters   
squared.  
Exam limited to gross   
visualization where appropriate.  
Gen.: This is an age-appropriate   
patient in no acute distress.  
Head: Appears atraumatic with no   
visible lesions.  
Eyes: Pupils equally round and   
reactive to light, extraocular   
muscles are intact.  
Neck: Supple.  
Respiratory: Appears to be   
respiring comfortably.  
Neurologic: Nonfocal to gross   
visualization. Alert and   
oriented 3.  
Psychiatric: No evidence of   
inappropriate anxiety or   
depression.  
Skin: Visible areas of skin   
without rash, lesions, wounds or   
petechiae.  
  
________________________________  
________________________________  
_______________________  
ALLERGIES:  
ALLERGIES  
No Known Allergies  
  
MEDICATIONS:  
apixaban (ELIQUIS) 5 mg tab(s)   
Take 1 tablet by mouth two times   
a day.  
omeprazole (PRILOSEC) 40 mg   
capsule  
  
________________________________  
________________________________  
_______________________  
LABORATORY VALUES:  
WBC (k/uL)  
Date Value  
2024 4.67  
  
RBC (m/uL)  
Date Value  
2024 5.04  
  
Hemoglobin (g/dL)  
Date Value  
2024 15.0  
  
Hematocrit (%)  
Date Value  
2024 46.2  
  
MCV (fL)  
Date Value  
2024 91.7  
  
MCH (pg)  
Date Value  
2024 29.8  
  
MCHC (g/dL)  
Date Value  
2024 32.5  
  
RDW-CV (%)  
Date Value  
2024 15.2 (H)  
  
Platelet Count (k/uL)  
Date Value  
2024 209  
  
MPV (fL)  
Date Value  
2024 10.7  
  
Glucose (mg/dL)  
Date Value  
2024 110 (H)  
  
BUN (mg/dL)  
Date Value  
2024 17  
  
Creatinine (mg/dL)  
Date Value  
2024 1.10  
  
Sodium (mmol/L)  
Date Value  
2024 144  
  
Potassium (mmol/L)  
Date Value  
2024 4.5  
  
Chloride (mmol/L)  
Date Value  
2024 107  
  
CO2 (mmol/L)  
Date Value  
2024 27  
  
Protein, Total (g/dL)  
Date Value  
2024 7.0  
  
Albumin (g/dL)  
Date Value  
2024 4.5  
  
Calcium, Total (mg/dL)  
Date Value  
2024 10.0  
  
Alkaline Phosphatase (U/L)  
Date Value  
2024 74  
  
Bilirubin, Total (mg/dL)  
Date Value  
2024 0.8  
  
AST (U/L)  
Date Value  
2024 21  
  
ALT (U/L)  
Date Value  
2024 16  
  
________________________________  
________________________________  
_______________________  
DIAGNOSIS: (Z79.01) Chronic   
anticoagulation (primary   
encounter diagnosis)  
Plan: apixaban (ELIQUIS) 5 mg   
tab(s), COMPLETE BLOOD  
COUNT AND DIFFERENTIAL,   
COMPREHENSIVE METABOLIC  
PANEL, IRON AND TIBC, FERRITIN,   
VITAMIN B12,  
FOLATE, SERUM  
  
(I82.4Y3) Deep vein thrombosis   
(DVT) of proximal vein of both   
lower extremities, unspecified   
chronicity (HCC)  
Plan: COMPLETE BLOOD COUNT AND   
DIFFERENTIAL,  
COMPREHENSIVE METABOLIC PANEL,   
IRON AND TIBC,  
FERRITIN, VITAMIN B12, FOLATE,   
SERUM  
  
(D50.0) Iron deficiency anemia   
due to chronic blood loss  
Plan: COMPLETE BLOOD COUNT AND   
DIFFERENTIAL,  
COMPREHENSIVE METABOLIC PANEL,   
IRON AND TIBC,  
FERRITIN, VITAMIN B12, FOLATE,   
SERUM  
  
  
PAST MEDICAL HISTORY  
Diagnosis Date  
Anemia due to GI blood loss   
2021  
Esophageal ulcer  
Essential hypertension  
Iron deficiency anemia  
Iron deficiency anemia due to   
chronic blood loss 2021  
Poor iron absorption 2021  
Pulmonary embolism (HCC)  
  
History reviewed. No pertinent   
surgical history.  
Social History  
  
Tobacco Use  
Smoking status: Never  
Smokeless tobacco: Never  
  
History reviewed. No pertinent   
family history.  
  
I spent a total of 30 minutes on   
the date of the service which   
included preparing to see the   
patient, face-to-face patient   
care, completing clinical   
documentation, performing a   
medically appropriate   
examination, counseling and   
educating the   
patient/family/caregiver,   
ordering medications, tests, or   
procedures, independently   
interpreting results (not   
separately reported), and   
communicating results to the   
patient/family/caregiver.  
  
Chaka Herman MD, CPE  
[facility]   
Provided at:  
[place], [place]  
  
Scribe Attestation:  
This note was scribed by   
Julia Yang on 2024 under the direction and   
supervision of Dr. Chaka Herman. I attest that all of   
the information documented is   
correct to the best of my   
knowledge.  
  
Provider Attestation:  
I, Chaka Herman MD, attest   
that all information documented   
by the above scribe is correct,   
and was supervised by me and   
under my direction.  
  
CC:  
Sharron Grant  
[address] [facility] [address]  
Electronically signed by   
Chaka Herman MD at   
2024 8:14 AM EDT  
documented in this encounter            [facility]  
   
                                        2024 Note     HNO ID: [id number]  
Author: CHAKA HERMAN MD  
Service: ?  
Author Type: Physician  
Type: Progress Notes  
Filed: 2024 08:14  
Note Text:  
NAME: Kristofer Harish  
CLINIC NO.: [id number]  
DATE OF SERVICE: 2024   
(Ishmael)  
Some elements in this clinic   
note that are critical to   
medical decision making  
have been carefully reviewed and   
included from a prior clinic   
note dated: May  
12, 2023 (Ishmael)  
Referring Provider: Sharron Grant  
Additional Clinicians involved   
in Harish Sargent's care:  
DIAGNOSIS:  
Iron deficiency anemia  
ASSESSMENT: 54 year old man with   
significant anemia that is   
likely chronic and  
related to prior GI bleed. He   
does not appear to have   
additional bleeding  
although he is now   
anticoagulated since 2021 for   
saddle embolus and left lower  
extremity DVT. He is very   
energetic and doesn't appear to   
have any decrease in  
exercise tolerance indicating an   
adapted state to his anemia. He   
also has a  
healthy diet with plenty of iron   
consumption indicating a likely   
malabsorptive  
condition to iron in addition to   
his prior loss of blood. His   
hypercoagulable  
workup was negative.  
There does not appear to be an   
underlying malignant process.  
Great response to iron. However   
several months later I suspect   
that he has  
underlying malabsorption in   
addition to chronic blood loss.   
This appears to have  
resolved.  
Suspect elevated creatinine is   
dependent on how well he   
hydrates. May need  
further workup on return.  
Continue Eliquis for now (PE -   
symptoms have resolved)  
PLAN:  
Labs, including iron studies, in   
6 months  
RTC 1 week after  
Continue anticoagulation given   
massive PE  
________________________________  
________________________________  
________________  
_______  
HPI:  
CASE HISTORY: Reverse   
Chronological Order  
2023 - CBC 6.77 >   
16.1/49.1 < 250, CMP normal,   
Iron 135, TIBC 448,  
transferrin saturation 30.1%,   
ferritin 20, B12 431, folate   
13.3  
2022 - 2nd dose Monoferric  
2022 - Volume associated   
renal failure / ATN - corrects   
with oral  
hydration  
2021 - Monoferric  
2021 - Persisting microcytic   
anemia  
10/2021 - Hypercoagulable   
evaluation negative  
2021 - Released from   
[facility] after   
Heparinization and long term  
anticoagulation. Refused   
Hematology consult.  
2021 - Lower extremity   
doppler + Left lower ext DVT of   
dist Femoral,  
popliteal and peroneal veins  
2021 - Bilateral PE and   
saddle embolus on CTA. Mildly   
prominent hilar and  
mediastinal nodes noted.  
2021 - CT Abdomen   
unremarkable except left   
hydronephrosis due to chronic  
UPJ obstruction  
2021 - Dr. Araiza repeat   
EGD - erosive esophagitis,   
hiatal hernia, no  
Lee's.  
2021 - ER evaluation for   
fatigue and dyspnea severe   
anemia - found to have  
esophageal ulcer  
Updated Visit, 2024:  
Harish returns today for a follow   
up. 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   
is a supervisor  
at [employer]. He does have   
significant exposure to lead, he   
endorses wearing  
PPE. He now takes zinc, vitamin   
C, and iron supplements on top   
of Eliquis. He no  
longer takes omeprazole as his   
GERD has improved. His CBC   
remains normal from my  
perspective.  
Updated Visit, May 12, 2023:  
Harish continues to do very well.   
H/H completely normal now.   
Unsure what the  
cause of his initial loss and   
anemia was but likely had   
chronic bleeding from  
erosive esophagitis.  
Updated Visit, 2022: Telephone only for 5   
minutes  
Called Harish as requested. He   
feels fantastic. Still on   
anticoagulation. CBC  
remains corrected with hgb 16.   
Will discuss d/c'ing   
anticoagulation at the next  
visit.  
Updated Visit, May 13, 2022:  
Harish returns and feels great -   
his H/H has recovered fully. He   
remains on  
anticoagulation but is   
interested in stopping. Given   
the severity of his  
thrombotic events, I would   
recommend that he stay on for at   
tleast 1 year. He  
believes that COVID-19 was the   
cause of his DVT and pulmonary   
emboli. No other  
illiciting factors have been   
discovered.  
Updated Visit, 2022:  
Continued improvement in   
Hgb/Hct.  
Still has microcytic indices.  
Still feels that he has no   
symptoms  
Unaware of kidney dysfunction -   
corrected after increasing H2O   
untake but now is  
worse again.  
May need renal workup, but   
patient would prefer to try and   
hydrate on his own  
and then reconsider.  
Will continue iron infusion   
today.  
Updated Visit, 2022:  
Impressive improvement in H/H   
with iron infusion. CMP wasn't   
back until after  
he left. Will need to repeat   
next week to ensure he doesn't   
have ARF. He has  
no signs or symptoms of CHF.   
Will anticipate anticoagulation   
for 1 year and  
then consider discontinuing.  
Initial Visit, 2021:  
Harish Sargent presents today   
Hematology and Oncology   
evaluation. He is a 51  
year old male who is very   
energetic and makes a living as   
a farmer, living off  
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   
livestock, chores etc. That   
require heavy  
exertion.  
In 2021 he bec (more content   
not included)...                        [facility]  
   
                                                    2024 Telephone   
encounter Note                          Formatting of this note might be   
different from the original.  
Patient has an appt on .   
Would you like labs?  
  
Electronically signed by Beau Mayorga MA at 2024 10:08 AM   
EDT  
                                        [facility]  
   
                                                    2024 Miscellaneous   
Notes                                   Formatting of this note might be   
different from the original.  
Patient has an appt on .   
Would you like labs?  
  
Electronically signed by Beau Mayorga MA at 2024 10:08 AM   
EDT  
documented in this encounter            [facility]  
   
                                        2022 Instructions   
  
  
Chaka Herman MD - 2022   
5:03 PM ESTFormatting of this   
note might be different from the   
original.  
RTC in 6 months - as scheduled  
May decide to stop   
anticoagulation after completing   
1 year therapy.  
Labs same day of return  
Electronically signed by Chaka Herman MD at 2022 5:03   
PM EST  
  
documented in this encounter            [facility]  
   
                                                    2022 History of Presen  
t   
illness Narrative                       Formatting of this note is   
different from the original.  
Images from the original note   
were not included.  
NAME: Harish Sargent  
CLINIC NO.: [id number]  
DATE OF SERVICE: 2022 (Ishmael)  
  
Some elements in this clinic   
note that are critical to   
medical decision making have   
been carefully reviewed and   
included from a prior clinic   
note dated: May 13, 2022   
(Ishmael)  
  
Referring Provider: Sharron Grant  
  
Additional Clinicians involved   
in Harish Sargent's care:  
  
VIRTUAL VISIT PROGRESS NOTE  
  
This is a virtual visit using   
Telephone only for 5 minutes. It   
required patient-provider   
interaction for the medical   
decision making as documented   
below.  
  
DIAGNOSIS:  
Iron deficiency anemia  
  
ASSESSMENT: 52 year old man with   
significant anemia that is   
likely chronic and related to   
prior GI bleed. He does not   
appear to have additional   
bleeding although he is now   
anticoagulated since 2021 for   
saddle embolus and left lower   
extremity DVT. He is very   
energetic and doesn't appear to   
have any decrease in exercise   
tolerance indicating an adapted   
state to his anemia. He also has   
a healthy diet with plenty of   
iron consumption indicating a   
likely malabsorptive condition   
to iron in addition to his prior   
loss of blood. His   
hypercoagulable workup was   
negative.  
There does not appear to be an   
underlying malignant process.  
Great response to iron.  
Suspect elevated creatinine is   
dependent on how well he   
hydrates. May need further   
workup on return.  
Continue Eliquis for now (PE -   
symptoms have resolved)  
  
PLAN:  
RTC in 6 months - as scheduled  
May decide to stop   
anticoagulation after completing   
1 year therapy.  
Labs same day of return  
  
CURRENT TREATMENT:  
1. Anticoagulation for LLExt.   
DVT, PE  
  
________________________________  
________________________________  
_______________________  
HPI:  
CASE HISTORY:  
2022 - 2nd dose Monoferric  
2022 - volume associated   
renal failure / ATN - corrects   
with oral hydration  
2021 - Monoferric  
2021 - Persisting microcytic   
anemia  
10/2021 - hypercoagulable   
evaluation negative  
2021 - Released from   
[facility] after   
Heparinization and long term   
anticoagulation. Refused   
Hematology consult.  
2021 - Lower extremity   
doppler + Left lower ext DVT of   
dist Femoral, popliteal and   
peroneal veins  
2021 - Bilateral PE and   
saddle embolus on CTA. Mildly   
prominent hilar and mediastinal   
nodes noted.  
2021 - CT Abdomen   
unremarkable except left   
hydronephrosis due to chronic   
UPJ obstruction  
2021 - Dr. Araiza repeat EGD   
- erosive esophagitis, hiatal   
hernia, no Lee's.  
2021 - ER evaluation for   
fatigue and dyspnea severe   
anemia - found to have   
esophageal ulcer  
  
  
  
Updated Visit, 2022: Telephone only for 5   
minutes  
Called Harish as requested. He   
feels fantastic. Still on   
anticoagulation. CBC remains   
corrected with hgb 16. Will   
discuss d/c'ing anticoagulation   
at the next visit.  
  
Updated Visit, May 13, 2022:  
Harish returns and feels great -   
his H/H has recovered fully. He   
remains on anticoagulation but   
is interested in stopping. Given   
the severity of his thrombotic   
events, I would recommend that   
he stay on for at tleast 1 year.   
He believes that COVID-19 was   
the cause of his DVT and   
pulmonary emboli. No other   
illiciting factors have been   
discovered.  
Current CBC:  
Hemoglobin (g/dL)  
Date Value  
2022 16.0  
2022 12.7  
  
Hematocrit (%)  
Date Value  
2022 48.2  
2022 43.3  
  
WBC (k/uL)  
Date Value  
2022 6.49  
2022 5.72  
  
Updated Visit, 2022:  
Continued improvement in   
Hgb/Hct.  
Still has microcytic indices.  
Still feels that he has no   
symptoms  
Unaware of kidney dysfunction -   
corrected after increasing H2O   
untake but now is worse again.  
May need renal workup, but   
patient would prefer to try and   
hydrate on his own and then   
reconsider.  
Will continue iron infusion   
today.  
  
Updated Visit, 2022:  
Impressive improvement in H/H   
with iron infusion. CMP wasn't   
back until after he left. Will   
need to repeat next week to   
ensure he doesn't have ARF. He   
has no signs or symptoms of CHF.   
Will anticipate anticoagulation   
for 1 year and then consider   
discontinuing.  
  
Initial Visit, 2021:  
Harish Sargent presents today   
Hematology and Oncology   
evaluation. He is a 51 year old   
male who is very energetic and   
makes a living as a farmer,   
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   
busy with livestock, chores etc.   
That require heavy exertion.  
  
In 2021 he become fairly   
sluggish and couldn't get his   
chores done in his typical rapid   
manner and he was found to be   
significantly anemic. CBC:   
7>7.1/25.1<283. RBC indices were   
microcytic. He did not report   
any tarry stools.  
  
Repeat EGD in 2021 showed the   
ulcer was healed.  
  
He then developed an un-provoked   
DVT left leg and subsequent   
bilateral PE with saddle embolus   
causing acute right heart   
strain, improved quickly with   
heparinization. He had CT scans   
that didn't show any significant   
evidence of malignancy and his   
COVID-19 testing was negative.   
He remains un-vaccinated.  
  
He was not initially interested   
in a hematology consultation   
with respect to unprovoked   
thrombosis but is now presenting   
on kind referral from Dr. Girvin   
for persisting anemia.  
  
His most recent labs indicate   
significant iron deficiency with   
microcytic anemia.  
  
HISTORY REVIEWED (electronic   
chart updated):  
PAST MEDICAL HISTORY  
Diagnosis Date  
Anemia due to GI blood loss   
2021  
Esophageal ulcer  
Essential hypertension  
Iron deficiency anemia  
Iron deficiency anemia due to   
chronic blood loss 2021  
Poor iron absorption 2021  
Pulmonary embolism (HCC)  
  
No past surgical history on   
file.  
No family history on file.  
Social History  
  
Tobacco Use  
Smoking status: Never  
Smokeless tobacco: Never  
  
Current Outpatient Medications  
Medication Sig  
ELIQUIS 5 mg tab(s)  
omeprazole (PRILOSEC) 40 mg   
capsule  
  
No current facility-administered   
medications for this visit.  
  
ALLERGIES  
No Known Allergies  
  
REVIEW OF SYSTEMS:  
As noted in HPI  
  
PHYSICAL EXAMINATION:  
  
VIDEO EXAM: (if completed,   
performed via video enabled   
technology)  
No exam performed  
  
________________________________  
________________________________  
_______________________  
LABORATORY VALUES:  
WBC (k/uL)  
Date Value  
2022 6.49  
  
RBC (m/uL)  
Date Value  
2022 5.11  
  
Hemoglobin (g/dL)  
Date Value  
2022 16.0  
  
Hematocrit (%)  
Date Value  
2022 48.2  
  
MCV (fL)  
Date Value  
2022 94.3  
  
MCH (pg)  
Date Value  
2022 31.3  
  
MCHC (g/dL)  
Date Value  
2022 33.2  
  
RDW-CV (%)  
Date Value  
2022 12.3  
  
Platelet Count (k/uL)  
Date Value  
2022 237  
  
MPV (fL)  
Date Value  
2022 10.4  
  
Glucose (mg/dL)  
Date Value  
2022 158 (H)  
  
BUN (mg/dL)  
Date Value  
2022 16  
  
Creatinine (mg/dL)  
Date Value  
2022 0.98  
  
Sodium (mmol/L)  
Date Value  
2022 137  
  
Potassium (mmol/L)  
Date Value  
2022 4.3  
  
Chloride (mmol/L)  
Date Value  
2022 103  
  
CO2 (mmol/L)  
Date Value  
2022 26  
  
Protein, Total (g/dL)  
Date Value  
2022 7.0  
  
Albumin (g/dL)  
Date Value  
2022 4.4  
  
Calcium, Total (mg/dL)  
Date Value  
2022 9.5  
  
Alkaline Phosphatase (U/L)  
Date Value  
2022 78  
  
Bilirubin, Total (mg/dL)  
Date Value  
2022 0.6  
  
AST (U/L)  
Date Value  
2022 20  
  
ALT (U/L)  
Date Value  
2022 17  
  
________________________________  
________________________________  
_______________________  
DIAGNOSIS: (D50.0) Iron   
deficiency anemia due to chronic   
blood loss (primary encounter   
diagnosis)  
Plan: CBC + DIFF, COMP METABOLIC   
PANEL, IRON + TIBC,  
FERRITIN BLD, VITAMIN B12 BLOOD,   
FOLATE SERUM  
  
(K90.89) Poor iron absorption  
Plan: CBC + DIFF, COMP METABOLIC   
PANEL, IRON + TIBC,  
FERRITIN BLD, VITAMIN B12 BLOOD,   
FOLATE SERUM  
  
  
Chaka Herman MD, CPE  
Hematology and Oncology Services   
Provided at:  
[place], [place]  
  
CC:  
Sharron Grant  
[address] [address]  
  
Electronically signed by Chaka Herman MD at 2022   
10:18 AM EST  
documented in this encounter            [facility]  
   
                                                    2022 History of Presen  
t   
illness Narrative                         
  
  
Formatting of this note is   
different from the original.  
Images from the original note   
were not included.  
NAME: Middle RiverHarish  
[facility] NO.: [id number]  
DATE OF SERVICE: May 13, 2022  
  
Some elements in this clinic   
note that are critical to   
medical decision making have   
been carefully reviewed and   
included from a prior clinic   
note dated: 2022  
  
Referring Provider: Sharron Grant  
  
Additional Clinicians involved   
in Harish Sargent's care:  
  
DIAGNOSIS:  
Iron deficiency anemia  
  
ASSESSMENT: 52 year old man with   
significant anemia that is   
likely chronic and related to   
prior GI bleed. He does not   
appear to have additional   
bleeding although he is now   
anticoagulated since 2021 for   
saddle embolus and left lower   
extremity DVT. He is very   
energetic and doesn't appear to   
have any decrease in exercise   
tolerance indicating an adapted   
state to his anemia. He also has   
a healthy diet with plenty of   
iron consumption indicating a   
likely malabsorptive condition   
to iron in addition to his prior   
loss of blood. His   
hypercoagulable workup was   
negative.  
There does not appear to be an   
underlying malignant process.  
Great response to iron.  
Suspect elevated creatinine is   
dependent on how well he   
hydrates. May need further   
workup on return.  
Continue Eliquis for now (PE -   
symptoms have resolved)  
  
PLAN:  
1. Labs in 6 months - phone call   
after  
2. May decide to stop   
anticoagulation after 1 year   
therapy.  
3. Labs in 12 months - RTC same   
day  
  
CURRENT TREATMENT:  
1. Anticoagulation for LLExt.   
DVT, PE  
  
________________________________  
________________________________  
_______________________  
HPI:  
CASE HISTORY:  
2022 - 2nd dose Monoferric  
2022 - volume associated   
renal failure / ATN - corrects   
with oral hydration  
2021 - Monoferric  
2021 - Persisting microcytic   
anemia  
10/2021 - hypercoagulable   
evaluation negative  
2021 - Released from   
[facility] after   
Heparinization and long term   
anticoagulation. Refused   
Hematology consult.  
2021 - Lower extremity   
doppler + Left lower ext DVT of   
dist Femoral, popliteal and   
peroneal veins  
2021 - Bilateral PE and   
saddle embolus on CTA. Mildly   
prominent hilar and mediastinal   
nodes noted.  
2021 - CT Abdomen   
unremarkable except left   
hydronephrosis due to chronic   
UPJ obstruction  
2021 - Dr. Ditty repeat EGD   
- erosive esophagitis, hiatal   
hernia, no Lee's.  
2021 - ER evaluation for   
fatigue and dyspnea severe   
anemia - found to have   
esophageal ulcer  
  
  
  
Updated Visit, May 13, 2022:  
Harish returns and feels great -   
his H/H has recovered fully. He   
remains on anticoagulation but   
is interested in stopping. Given   
the severity of his thrombotic   
events, I would recommend that   
he stay on for at tleast 1 year.   
He believes that COVID-19 was   
the cause of his DVT and   
pulmonary emboli. No other   
illiciting factors have been   
discovered.  
Current CBC:  
Hemoglobin (g/dL)  
Date Value  
2022 15.9  
2022 12.7  
  
Hematocrit (%)  
Date Value  
2022 48.7  
2022 43.3  
  
WBC (k/uL)  
Date Value  
2022 5.83  
2022 5.72  
  
Updated Visit, 2022:  
Continued improvement in   
Hgb/Hct.  
Still has microcytic indices.  
Still feels that he has no   
symptoms  
Unaware of kidney dysfunction -   
corrected after increasing H2O   
untake but now is worse again.  
May need renal workup, but   
patient would prefer to try and   
hydrate on his own and then   
reconsider.  
Will continue iron infusion   
today.  
  
Updated Visit, 2022:  
Impressive improvement in H/H   
with iron infusion. CMP wasn't   
back until after he left. Will   
need to repeat next week to   
ensure he doesn't have ARF. He   
has no signs or symptoms of CHF.   
Will anticipate anticoagulation   
for 1 year and then consider   
discontinuing.  
  
Initial Visit, 2021:  
Harish Sargent presents today   
Hematology and Oncology   
evaluation. He is a 51 year old   
male who is very energetic and   
makes a living as a farmer,   
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   
busy with livestock, chores etc.   
That require heavy exertion.  
  
In 2021 he become fairly   
sluggish and couldn't get his   
chores done in his typical rapid   
manner and he was found to be   
significantly anemic. CBC:   
7>7.1/25.1<283. RBC indices were   
microcytic. He did not report   
any tarry stools.  
  
Repeat EGD in 2021 showed the   
ulcer was healed.  
  
He then developed an un-provoked   
DVT left leg and subsequent   
bilateral PE with saddle embolus   
causing acute right heart   
strain, improved quickly with   
heparinization. He had CT scans   
that didn't show any significant   
evidence of malignancy and his   
COVID-19 testing was negative.   
He remains un-vaccinated.  
  
He was not initially interested   
in a hematology consultation   
with respect to unprovoked   
thrombosis but is now presenting   
on kind referral from Dr. Grant   
for persisting anemia.  
  
His most recent labs indicate   
significant iron deficiency with   
microcytic anemia.  
  
________________________________  
________________________________  
_______________________  
RADIOGRAPHIC DATA: Reviewed on   
2021  
1. Reviewed in case history  
  
________________________________  
________________________________  
_______________________  
PATHOLOGIC PROFILE/MOLECULAR   
DATA: Reviewed on 2021  
1. N/A  
  
________________________________  
________________________________  
_______________________  
REVIEW OF SYSTEMS  
Per HPI and otherwise negative   
by full review of organ systems.  
  
________________________________  
________________________________  
_______________________  
ECOG PERFORMANCE STATUS: 0  
PHYSICAL EXAMINATION:  
Vitals: /78   Pulse 91     
Temp (Src) 97.8 (Temporal)     
Resp 16   Ht 5' 9.055  (1.75m)     
Wt 224 lb 6.4 oz (101.8kg)     
SpO2 99%   BMI 33.09 kg/(m^2).  
  
Body surface area is 2.23 meters   
squared.  
Exam limited to gross   
visualization where appropriate   
due to COVID-19.  
Gen.: This is an age-appropriate   
patient in no acute distress. No   
pallor.  
Head: Appears atraumatic with no   
visible lesions.  
Eyes: Pupils equally round and   
reactive to light, extraocular   
muscles are intact.  
Neck: Supple.  
Mouth: Mucous membranes appeared   
to be moist.  
Respiratory: Appears to be   
respiring comfortably.  
Neurologic: Nonfocal to gross   
visualization. Alert and   
oriented 3.  
Psychiatric: No evidence of   
inappropriate anxiety or   
depression.  
Skin: Visible areas of skin   
without rash, lesions, wounds or   
petechiae.  
  
________________________________  
________________________________  
_______________________  
ALLERGIES:  
ALLERGIES  
No Known Allergies  
  
MEDICATIONS:  
  
ELIQUIS 5 mg tab(s)  
omeprazole (PRILOSEC) 40 mg   
capsule  
  
  
________________________________  
________________________________  
_______________________  
LABORATORY VALUES:  
Noted in HPI  
  
________________________________  
________________________________  
_______________________  
DIAGNOSIS: (D50.0) Iron   
deficiency anemia due to chronic   
blood loss (primary encounter   
diagnosis)  
Plan: CBC + DIFF, COMP METABOLIC   
PANEL  
  
(K90.89) Poor iron absorption  
Plan: CBC + DIFF, COMP METABOLIC   
PANEL  
  
  
PAST MEDICAL HISTORY  
Diagnosis Date  
Anemia due to GI blood loss   
2021  
Esophageal ulcer  
Essential hypertension  
Iron deficiency anemia  
Iron deficiency anemia due to   
chronic blood loss 2021  
Poor iron absorption 2021  
Pulmonary embolism (HCC)  
  
No past surgical history on   
file.  
Social History  
  
Tobacco Use  
Smoking status: Never Smoker  
Smokeless tobacco: Never Used  
Substance Use Topics  
Alcohol use: Not on file  
Drug use: Not on file  
  
No family history on file.  
  
I spent a total of 20 minutes on   
the date of the service which   
included preparing to see the   
patient, face-to-face patient   
care, completing clinical   
documentation, performing a   
medically appropriate   
examination, counseling and   
educating the   
patient/family/caregiver and   
ordering medications, tests, or   
procedures.  
  
Chaka Herman MD, [place], [place]  
  
CC:  
Sharron Grant  
[address] [place] [id number]  
  
  
  
Electronically signed by Chaka Herman MD at 05/15/2022 8:52   
AM EDTdocumented in this   
encounter                               [facility]  
   
                                                    2022 Miscellaneous   
Notes                                     
  
  
Formatting of this note might be   
different from the original.  
Approved for co pay assistance   
as of 22 with a look back   
period of 120 days and approved   
until 23.  
  
Larisa Marr RPh  
  
  
  
Electronically signed by Nicole Marr RPh at 2022   
2:11 PM EDTdocumented in this   
encounter                               [facility]  
   
                                                    2022 Miscellaneous   
Notes                                     
  
  
Formatting of this note might be   
different from the original.  
Patient brought paperwork in to   
[facility] to be filled for enrollment   
into the Monoferric Copay rashida.   
Was faxed on  from [facility]. I   
followed up on this today with   
Luis at [facility]. They were missing   
some info to approve the claim   
but once I provided, Harish was   
approved from 22- 5/3/23   
with 120 day look back period.   
$2000 per treatment Max of $4000   
allowed. ID#[id number]  
Claim submitted to [employer] for DOS 22 $658.33  
  
  
Electronically signed by   
Yessica Roberson [facility] at   
2022 1:24 PM EDTdocumented   
in this encounter                       [facility]  
   
                                                    2022 Miscellaneous   
Notes                                     
  
  
Formatting of this note might be   
different from the original.  
Called and notified Melisa the   
application was faxed and   
provided her the phone number to   
call the drug  for   
update in the next day or so.  
  
I also let her know Lizeth S will   
obtain the copay from the   
program once approved.  
I advised her to call back if   
she had any further questions.  
  
Katy Randall RPh  
  
  
  
Electronically signed by Katy Randall RPh at 2022 4:37   
PM EDT  
  
  
Formatting of this note might be   
different from the original.  
Melisa called in to get an update   
on the Monoferric copay   
application.  
I located the signed application   
and faxed it to [phone]. I   
also faxed to Lizeth Roberson,   
financial navigator.  
  
  
  
Electronically signed by Katy Randall RPh at 2022 1:58   
PM EDTdocumented in this   
encounter                               [facility]  
  
  
  
                                                    Evaluation note   
  
  
  
                                                    Diagnosis  
   
                                                      
  
  
Iron deficiency anemia due to chronic blood loss- Primary  
  
  
Iron deficiency anemia secondary to blood loss (chronic)  
   
                                                      
  
  
Poor iron absorption  
  
  
Other specified intestinal malabsorption  
  
documented in this encounter  
[facility]   
  
                                                    Diagnosis  
   
                                                      
  
  
Iron deficiency anemia due to chronic blood loss- Primary  
  
  
Iron deficiency anemia secondary to blood loss (chronic)  
   
                                                      
  
  
Poor iron absorption  
  
  
Other specified intestinal malabsorption  
   
                                                      
  
  
Chronic anticoagulation  
  
  
Long-term (current) use of anticoagulants  
   
                                                      
  
  
Pulmonary embolus with infarction (HCC)  
  
  
Other pulmonary embolism and infarction  
   
                                                      
  
  
Deep vein thrombosis (DVT) of proximal vein of both lower extremities, 
unspecified   
chronicity (HCC)  
  
documented in this encounter  
[facility]   
  
                                                    Diagnosis  
   
                                                      
  
  
Chronic anticoagulation- Primary  
  
  
Long-term (current) use of anticoagulants  
   
                                                      
  
  
Pulmonary embolus with infarction (HCC)  
  
  
Other pulmonary embolism and infarction  
   
                                                      
  
  
Deep vein thrombosis (DVT) of proximal vein of both lower extremities, 
unspecified   
chronicity (HCC)  
   
                                                      
  
  
Poor iron absorption  
  
  
Other specified intestinal malabsorption  
  
documented in this encounter  
[facility]   
  
                                                    Diagnosis  
   
                                                      
  
  
Chronic anticoagulation- Primary  
  
  
Long-term (current) use of anticoagulants  
   
                                                      
  
  
Deep vein thrombosis (DVT) of proximal vein of both lower extremities, 
unspecified   
chronicity (HCC)  
   
                                                      
  
  
Iron deficiency anemia due to chronic blood loss  
  
  
Iron deficiency anemia secondary to blood loss (chronic)  
  
documented in this encounter  
[facility]   
  
                          Diagnosis    Onset Date   Resolution   Status  
   
                          DVT (deep venous thrombosis)                            
 acute  
   
                          Hyperglycemia                           acute  
   
                          Prostate cancer screening                           ac  
ana  
   
                          Wellness examination                           acute  
  
  
[facility]  
Work Phone: [phone]  
  
Summary Purpose  
  
  
                                                      
  
  
  
Family History  
  
  
                          Relationship Condition    Age at Onset Recorded Date/T  
ajay  
   
                          father       Myocardial infarction Unknown        
   
                                       Diabetes mellitus Unknown        
   
                          father            Unknown        
   
                                       Heart disease Unknown        
  
  
  
Advance Directives  
  
  
                                Advance Directive Response        Recorded Date/  
Time  
   
                                Advance Directives No               3:28pm  
  
  
  
Chief Complaint and Reason for Visit  
  
  
                                        Chief Complaint     wellness  
   
                                        Reason for Visit    DVT (deep venous thr  
ombosis)  
Hyperglycemia  
Prostate cancer screening  
Wellness examination  
  
  
  
Additional Source Comments  
  
  
  
                                                    PREGNANCY (unrecognized sect  
ion and content)  
   
                                                    No Pregnancy Status Records   
FoundNo Pregnancy Status Records FoundNo Pregnancy   
Status   
Records Found  
  
  
  
  
                                                    INFORMATION SOURCE (unrecogn  
ized section and content)  
   
                                          
  
  
  
                                        DATE CREATED        AUTHOR  
   
                                2021                      [facility]  
  
  
  
                                DATE CREATED    AUTHOR          AUTHOR'S ORGANIZ  
ATION  
   
                                01/15/2022                      [facility]  
  
  
  
                                DATE CREATED    AUTHOR          AUTHOR'S ORGANIZ  
ATION  
   
                                2024                      [facility]  
  
  
  
  
  
                                                    Source Comments (unrecognize  
d section and content)  
   
                                                    In the event this informatio  
n is protected by the Federal Confidentiality of   
Alcohol   
and Drug Abuse Patient Records regulations: This information has been disclosed 
to   
you from records protected by Federal confidentiality rules (42 CFR Part 2). The
   
Federal rules prohibit you from making any further disclosure of this 
information   
unless further disclosure is expressly permitted by the written consent of the 
person   
to whom it pertains or as otherwise permitted by 42 CFR Part 2. A general   
authorization for the release of medical or other information is NOT sufficient 
for   
this purpose. The Federal rules restrict any use of the information to 
criminally   
investigate or prosecute any alcohol or drug abuse patient.[facility] 
the   
event this information is protected by the Federal Confidentiality of Alcohol 
and   
Drug Abuse Patient Records regulations: This information has been disclosed to 
you   
from records protected by Federal confidentiality rules (42 CFR Part 2). The 
Federal   
rules prohibit you from making any further disclosure of this information unless
   
further disclosure is expressly permitted by the written consent of the person 
to   
whom it pertains or as otherwise permitted by 42 CFR Part 2. A general 
authorization   
for the release of medical or other information is NOT sufficient for this 
purpose.   
The Federal rules restrict any use of the information to criminally investigate 
or   
prosecute any alcohol or drug abuse patient.[facility] the event this   
information is protected by the Federal Confidentiality of Alcohol and Drug 
Abuse   
Patient Records regulations: This information has been disclosed to you from 
records   
protected by Federal confidentiality rules (42 CFR Part 2). The Federal rules   
prohibit you from making any further disclosure of this information unless 
further   
disclosure is expressly permitted by the written consent of the person to whom 
it   
pertains or as otherwise permitted by 42 CFR Part 2. A general authorization for
 the   
release of medical or other information is NOT sufficient for this purpose. The   
Federal rules restrict any use of the information to criminally investigate or   
prosecute any alcohol or drug abuse patient.[facility] the event this   
information is protected by the Federal Confidentiality of Alcohol and Drug 
Abuse   
Patient Records regulations: This information has been disclosed to you from 
records   
protected by Federal confidentiality rules (42 CFR Part 2). The Federal rules   
prohibit you from making any further disclosure of this information unless 
further   
disclosure is expressly permitted by the written consent of the person to whom 
it   
pertains or as otherwise permitted by 42 CFR Part 2. A general authorization for
 the   
release of medical or other information is NOT sufficient for this purpose. The   
Federal rules restrict any use of the information to criminally investigate or   
prosecute any alcohol or drug abuse patient.[facility] the event this   
information is protected by the Federal Confidentiality of Alcohol and Drug 
Abuse   
Patient Records regulations: This information has been disclosed to you from 
records   
protected by Federal confidentiality rules (42 CFR Part 2). The Federal rules   
prohibit you from making any further disclosure of this information unless 
further   
disclosure is expressly permitted by the written consent of the person to whom 
it   
pertains or as otherwise permitted by 42 CFR Part 2. A general authorization for
 the   
release of medical or other information is NOT sufficient for this purpose. The   
Federal rules restrict any use of the information to criminally investigate or   
prosecute any alcohol or drug abuse patient.[facility] the event this   
information is protected by the Federal Confidentiality of Alcohol and Drug 
Abuse   
Patient Records regulations: This information has been disclosed to you from 
records   
protected by Federal confidentiality rules (42 CFR Part 2). The Federal rules   
prohibit you from making any further disclosure of this information unless 
further   
disclosure is expressly permitted by the written consent of the person to whom 
it   
pertains or as otherwise permitted by 42 CFR Part 2. A general authorization for
 the   
release of medical or other information is NOT sufficient for this purpose. The   
Federal rules restrict any use of the information to criminally investigate or   
prosecute any alcohol or drug abuse patient.[facility] the event this   
information is protected by the Federal Confidentiality of Alcohol and Drug 
Abuse   
Patient Records regulations: This information has been disclosed to you from 
records   
protected by Federal confidentiality rules (42 CFR Part 2). The Federal rules   
prohibit you from making any further disclosure of this information unless 
further   
disclosure is expressly permitted by the written consent of the person to whom 
it   
pertains or as otherwise permitted by 42 CFR Part 2. A general authorization for
 the   
release of medical or other information is NOT sufficient for this purpose. The   
Federal rules restrict any use of the information to criminally investigate or   
prosecute any alcohol or drug abuse patient.[facility] the event this   
information is protected by the Federal Confidentiality of Alcohol and Drug 
Abuse   
Patient Records regulations: This information has been disclosed to you from 
records   
protected by Federal confidentiality rules (42 CFR Part 2). The Federal rules   
prohibit you from making any further disclosure of this information unless 
further   
disclosure is expressly permitted by the written consent of the person to whom 
it   
pertains or as otherwise permitted by 42 CFR Part 2. A general authorization for
 the   
release of medical or other information is NOT sufficient for this purpose. The   
Federal rules restrict any use of the information to criminally investigate or   
prosecute any alcohol or drug abuse patient.[facility] the event this   
information is protected by the Federal Confidentiality of Alcohol and Drug 
Abuse   
Patient Records regulations: This information has been disclosed to you from 
records   
protected by Federal confidentiality rules (42 CFR Part 2). The Federal rules   
prohibit you from making any further disclosure of this information unless 
further   
disclosure is expressly permitted by the written consent of the person to whom 
it   
pertains or as otherwise permitted by 42 CFR Part 2. A general authorization for
 the   
release of medical or other information is NOT sufficient for this purpose. The   
Federal rules restrict any use of the information to criminally investigate or   
prosecute any alcohol or drug abuse patient.[facility]  
  
  
  
  
                                                    Care Teams (unrecognized sec  
tion and content)  
   
                                          
  
  
  
                      Team Member Relationship Specialty  Start Date End Date  
   
                                                      
  
  
Sharron Grant, DO  
  
  
[address] [place] [id number] [phone] (Work)  
  
  
[phone] (Fax) PCP - General   Family Practice 21          
  
  
  
                      Team Member Relationship Specialty  Start Date End Date  
   
                                                      
  
  
Girvin, Sharron Huy, DO  
  
  
[address] [place] [id number] [phone] (Work)  
  
  
[phone] (Fax) PCP - General   Family Practice 21          
  
  
  
                      Team Member Relationship Specialty  Start Date End Date  
   
                                                      
  
  
Sharron Grant, DO  
  
  
[address] [place] [id number] [phone] (Work)  
  
  
[phone] (Fax) PCP - General   Family Practice 21          
  
  
  
                      Team Member Relationship Specialty  Start Date End Date  
   
                                                      
  
  
Sharron Grant, DO  
  
  
[address] [place] [id number] [phone] (Work)  
  
  
[phone] (Fax) PCP - General   Family Practice 21          
  
  
  
                      Team Member Relationship Specialty  Start Date End Date  
   
                                                      
  
  
Sharron Grant, DO  
  
  
[address] [place] [address] [phone] (Work)  
  
  
[phone] (Fax) PCP - General   Family Medicine 21          
  
  
  
                      Team Member Relationship Specialty  Start Date End Date  
   
                                                      
  
  
Sharron Grant, DO  
  
  
[address] [place] [id number] [phone] (Work)  
  
  
[phone] (Fax) PCP - General   Family Medicine 21          
  
  
  
                      Team Member Relationship Specialty  Start Date End Date  
   
                                                      
  
  
Sharron Grant DO  
  
  
NPI: [id number]  
  
  
[address] [place] [id number] [phone] (Work)  
  
  
[phone] (Fax) PCP - General   Family Medicine 21          
  
  
  
                      Team Member Relationship Specialty  Start Date End Date  
   
                                                      
  
  
Sharron Grant DO  
  
  
NPI: [id number]  
  
  
[address] [place] [id number] [phone] (Work)  
  
  
[phone] (Fax) PCP - General   Family Medicine 21          
  
  
  
                                                    Team Status: Active   
   
                          Member       Role         Status       Dates  
   
                          Sharron Grant DO Primary Care Provider Active         
  
  
  
                                                    Team Status: Inactive   
   
                          Member       Role         Status       Dates  
   
                                        Sharron Grant DO   Primary Care Provide  
r,   
Attending Provider        Active                    Start: 2024  
End: 2024  
  
  
  
  
  
                                                    Reason for Visit (unrecogniz  
ed section and content)  
   
                                          
  
  
  
                                        Reason              Comments  
   
                                        Medication Follow-up monoferric copay pr  
ogram  
  
  
  
                                        Reason              Comments  
   
                                        Benefits Investigation   
  
  
  
                                        Reason              Comments  
   
                                        Medication Assistance Program Monoferric  
  
  
  
                                        Reason              Comments  
   
                                        Anemia              3 month follow up  
  
  
  
                                        Reason              Comments  
   
                                        Established Patient   
  
  
  
                                Reason          Onset Date      Comments  
   
                                Refill Request  2022        
  
  
  
                                        Reason              Comments  
   
                                        Lab Orders            
  
  
  
                                        Reason              Comments  
   
                                        Anemia              Follow up  
  
  
  
  
  
                                                    Goals (unrecognized section   
and content)  
   
                                                    Goals may be documented in a  
n alternate section  
  
FOR RECORDS PERTAINING TO PATIENTS WHO ARE OR HAVE BEEN ENROLLED IN A CHEMICAL 
DEPENDENCY/SUBSTANCEABUSE PROGRAM, SOME INFORMATION MAY BE OMITTED. This 
clinical summary was aggregated from multiple sources. Caution should be 
exercised in using it in the provision of clinical care. This summary normalizes
 information from multiple sources, and as a consequence, information in this 
document may materially change the coding, format and clinical context of 
patient data. In addition, data may be omitted in some cases. CLINICAL DECISIONS
 SHOULD BE BASED ON THE PRIMARY CLINICAL RECORDS. [facility] [email or web address] provides
 no warranty or guarantee of the accuracy or completeness of information in this
 document.

## 2024-10-29 LAB — PSA, FREE: 0.66 NG/ML

## 2024-12-20 ENCOUNTER — LAB REQUISITION (OUTPATIENT)
Dept: DERMATOPATHOLOGY | Facility: CLINIC | Age: 54
End: 2024-12-20
Payer: COMMERCIAL

## 2024-12-20 DIAGNOSIS — C43.71 MALIGNANT MELANOMA OF RIGHT LOWER LIMB, INCLUDING HIP (MULTI): ICD-10-CM

## 2024-12-20 LAB
PATH REPORT.FINAL DX SPEC: NORMAL
PATH REPORT.GROSS SPEC: NORMAL
PATH REPORT.RELEVANT HX SPEC: NORMAL
PATH REPORT.TOTAL CANCER: NORMAL
PATHOLOGY SYNOPTIC REPORT: NORMAL

## 2024-12-20 PROCEDURE — 88321 CONSLTJ&REPRT SLD PREP ELSWR: CPT | Performed by: DERMATOLOGY

## 2025-01-05 DIAGNOSIS — C43.9 MELANOMA OF SKIN (MULTI): ICD-10-CM

## 2025-01-05 NOTE — TUMOR BOARD NOTE
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    Recommendation:  WLE with 1 cm margins.    Review Multidisciplinary Cutaneous Oncology Conference recommendation with patient.  Continue routine follow up and total body skin exams with Allie Cano.    Follow Up:  Dirk Lee.      History and Physical Exam  Dermatologic History:   54 y.o. male with a biopsy of the right distal calf on 11/5/2024 showing a a non-ulcerated melanoma, superficial spreading type, Breslow: 0.2 mm.        Pathology  Derm Consult: [id number]  Order: [id number]   Collected 12/20/2024 11:25       Status: Final result       Visible to patient: No (inaccessible in [facility])       Dx: Malignant melanoma of right lower johnson...    0 Result Notes      Component    FINAL DIAGNOSIS   6 SLIDES, [facility]OLOGY [facility]ORY OF [facility], #[id number] (BX: 11/05/2024)     SKIN, RIGHT DISTAL CALF, SHAVE BIOPSY:  MALIGNANT MELANOMA, BRESLOW THICKNESS 0.2 MM, SEE NOTE.     Note: Microscopic examination reveals a specimen that extends into the superficial dermis. There is a proliferation of single and nested melanocytes along the dermal-epidermal junction with an area of moderate to significant pagetoid extension. There are occasional small melanocytes in the dermis. A SOX-10 stain reveals moderate to significant pagetoid extension and a significant increase in single melanocytes along the dermal-epidermal junction in addition to nests. The melanocytes stain with antibodies against PRAME, and occasional melanocytes stain with antibodies against p16. There is occasional to moderate HMB45  staining.      ** Electronically signed out by Gladys Nguyen MD **               Electronically signed by Gladys Nguyen MD on 12/20/2024 at 1431   Case Summary Report   MELANOMA OF THE SKIN: Biopsy   8th Edition - Protocol posted: 3/23/2022MELANOMA OF THE SKIN: BIOPSY - All Specimens  SPECIMEN   Procedure  Biopsy, shave   Specimen Laterality  Right   TUMOR   Tumor Site  Skin of lower limb and hip: Right distal calf        Histologic Type  Superficial spreading melanoma (low-cumulative sun damage (CSD) melanoma)   Maximum Tumor (Breslow) Thickness (Millimeters)  0.2 mm   Ulceration  Not identified   Anatomic (Cedric) Level  II (melanoma present in but does not fill and expand papillary dermis)   Mitotic Rate  None identified   Microsatellite(s)  Not identified   Lymphovascular Invasion  Not identified   Neurotropism  Not identified   Tumor-Infiltrating Lymphocytes  Present, nonbrisk   Tumor Regression  Not identified   MARGINS     Margin Status for Invasive Melanoma  All margins negative for invasive melanoma   Margin Status for Melanoma in situ  All margins negative for melanoma in situ   PATHOLOGIC STAGE CLASSIFICATION (pTNM, AJCC 8th Edition)     pT Category  pT1a   .

## 2025-01-06 ENCOUNTER — TUMOR BOARD CONFERENCE (OUTPATIENT)
Dept: HEMATOLOGY/ONCOLOGY | Facility: HOSPITAL | Age: 55
End: 2025-01-06
Payer: COMMERCIAL

## 2025-01-28 ENCOUNTER — APPOINTMENT (OUTPATIENT)
Dept: DERMATOLOGY | Facility: CLINIC | Age: 55
End: 2025-01-28
Payer: COMMERCIAL

## 2025-01-28 VITALS — SYSTOLIC BLOOD PRESSURE: 124 MMHG | HEART RATE: 75 BPM | DIASTOLIC BLOOD PRESSURE: 67 MMHG

## 2025-01-28 DIAGNOSIS — C43.9 MELANOMA OF SKIN (MULTI): Primary | ICD-10-CM

## 2025-01-28 RX ORDER — OMEPRAZOLE 40 MG/1
40 CAPSULE, DELAYED RELEASE ORAL
COMMUNITY
Start: 2025-01-08 | End: 2026-01-08

## 2025-01-28 NOTE — PROGRESS NOTES
Excision Operative Note    Date of Surgery:  1/28/2025  Surgeon:  Dirk Malik MD PhD  Office Location: [address]  [facility] [address]  Dept: [phone]  Dept Fax: [phone]  Referring Provider: Allie Cano, APRN-CNP  [address]  [facility] [address]  [place] [address]    Subjective   Usman Mayers is a 54 y.o. male who presents for the following: Excision (Right Distal Calf, MM) for melanoma.    According to the patient, the lesion has been present for approximately greater than 1 year at the time of diagnosis.  The lesion is not causing symptoms.  The lesion has not been treated previously.    The patient does not have a pacemaker / defibrillator.  The patient does not have a heart valve / joint replacement.    The patient is on blood thinners.   The patient does not have a history of hepatitis B or C.  The patient does not have a history of HIV.  The patient does not have a history of immunosuppression (e.g. organ transplantation, malignancy, medications)    Is it okay to leave a phone message with results? Yes      The following portions of the chart were reviewed this encounter and updated as appropriate:         Assessment/Plan   Pre-procedure:   Obtained informed consent: written from patient  The surgical site was identified and confirmed with the patient.     Intra-operative:   Audible time out called at : 3:36 PM 01/28/25  by: RADAMES KING RN   Verified patient name, birthdate, site, specimen bottle label & requisition.    The planned procedure(s) was again reviewed with the patient. The risks of bleeding, infection, nerve damage and scarring were reviewed. The patient identity, surgical site, and planned procedure(s) were verified.     Biopsy Accession Number:  [id number]  Melanoma of skin (Multi)  Right Distal Calf    Skin excision    Lesion length (cm):  0.8  Lesion width (cm):  0.8  Margin per side (cm):   1  Total excision diameter (cm):  2.8  Melanoma Breslow depth (mm): 0  Informed consent: discussed and consent obtained    Timeout: patient name, date of birth, surgical site, and procedure verified    Timeout comment:  No LAD  Procedure prep:  Patient was prepped and draped  Anesthesia: the lesion was anesthetized in a standard fashion    Anesthetic:  1% lidocaine w/ epinephrine 1-100,000 local infiltration  Instrument used: #15 blade    Hemostasis achieved with: electrodesiccation    Outcome: patient tolerated procedure well with no complications    Post-procedure details: sterile dressing applied and wound care instructions given    Dressing type: pressure dressing, Kerlix, petrolatum, Hypafix, Telfa pad and Coban    Additional details:  Melanoma Mariana:   Curative Intent: Yes  Original Breslow Thickness: 0.2 mm  Clinical margin width: 1 cm  Depth of excision: Full thickness     Skin repair  Complexity:  Complex  Final length (cm):  6  Informed consent: discussed and consent obtained    Timeout: patient name, date of birth, surgical site, and procedure verified    Procedure prep:  Patient prepped in sterile fashion  Anesthesia: the lesion was anesthetized in a standard fashion    Anesthetic:  1% lidocaine w/ epinephrine 1-100,000 local infiltration  Reason for type of repair: reduce tension to allow closure    Undermining: edges undermined    Subcutaneous layers (deep stitches):   Suture size:  3-0  Suture type: Vicryl (polyglactin 910)    Stitches:  Buried vertical mattress  Fine/surface layer approximation (top stitches):   Suture size:  4-0  Suture type: Prolene (polypropylene)    Stitches: simple running    Hemostasis achieved with: electrodesiccation  Outcome: patient tolerated procedure well with no complications    Post-procedure details: sterile dressing applied and wound care instructions given    Dressing type: pressure dressing, bandage and petrolatum      Specimen 1 - Dermatopathology- DERM  LAB  Differential Diagnosis: Melanoma  Check Margins Yes  Comments:  [id number]  Dermpath Lab: Routine Histopathology (formalin-fixed tissue)      Complex Linear Repair - Wide Undermining:  Given the location and size of the defect, it was determined that a complex layered linear closure was required to restore normal anatomy and function. The repair was considered complex because extensive undermining was required and performed. The amount of undermining performed was greater than the maximum width of the defect as measured perpendicular to the closure line along at least one entire edge of the defect. Standing cutaneous cones were removed using Burow's triangles. The wound edges were brought into close approximation with buried vertical mattress sutures. The remainder of the wound was then closed with epidermal top sutures.              The final repair measured 6.0 cm    Wound care was discussed, and the patient was given written post-operative wound care instructions.      The patient will follow up with Dirk Malik MD PhD as needed for any post operative problems or concerns, and will follow up with their primary dermatologist as scheduled.

## 2025-01-30 ENCOUNTER — DOCUMENTATION (OUTPATIENT)
Dept: DERMATOLOGY | Facility: HOSPITAL | Age: 55
End: 2025-01-30
Payer: COMMERCIAL

## 2025-01-30 LAB
LABORATORY COMMENT REPORT: NORMAL
PATH REPORT.FINAL DX SPEC: NORMAL
PATH REPORT.GROSS SPEC: NORMAL
PATH REPORT.MICROSCOPIC SPEC OTHER STN: NORMAL
PATH REPORT.RELEVANT HX SPEC: NORMAL
PATH REPORT.TOTAL CANCER: NORMAL

## 2025-01-30 RX ORDER — CHLORHEXIDINE GLUCONATE 40 MG/ML
SOLUTION TOPICAL DAILY
Qty: 236 ML | Refills: 2 | Status: SHIPPED | OUTPATIENT
Start: 2025-01-30 | End: 2025-03-01

## 2025-01-30 RX ORDER — MUPIROCIN 20 MG/G
OINTMENT TOPICAL DAILY
Qty: 30 G | Refills: 2 | Status: SHIPPED | OUTPATIENT
Start: 2025-01-30 | End: 2025-03-01

## 2025-01-30 NOTE — PROGRESS NOTES
"Pt called on-call derm 1/29 PM. Stated he had a procedure with Dr. Malik that day and was told to  2 rx for \"leg protocol\" but they were not at pharmacy. Ascertained that the missing rx were Hibiclens sln and mupirocin ointment. Reviewed leg protocol instructions with pt and then called both rx into [facility] in [place] immediately. Mohs fellow notified 1/30.  "

## 2025-01-30 NOTE — RESULT ENCOUNTER NOTE
Patient called. No answer. Voicemail left with the following results and callback number.     Clear margins. No further treatment is needed.     Clinical follow up is recommended for any changes or new lesions of concern.

## 2025-04-13 DIAGNOSIS — C43.9 MELANOMA OF SKIN (MULTI): ICD-10-CM

## 2025-04-13 NOTE — TUMOR BOARD NOTE
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 S/p WLE with 1 cm margins. Adequately surgically treated.    Recommendation:  Annual H&P.    Review Multidisciplinary Cutaneous Oncology Conference recommendation with patient.  Continue routine follow up and total body skin exams with Allie Cano.    Follow Up:  Allie Cano,      History and Physical Exam  Dermatologic History:   54 y.o. male with a biopsy of the right distal calf on 11/5/2024 showing a a non-ulcerated melanoma, superficial spreading type, Breslow: 0.2 mm. He underwent a WLE with 1 cm margins on 1/28/2025 which showed changes consistent with previous procedure, inked margins free in the planes of sections examined without residual atypical melanocytic neoplasm seen.        Pathology  Dermatopathology- DERM LAB: [id number]  Order: [id number]   Collected 1/28/2025 13:25       Status: Final result       Visible to patient: Yes (seen)       Dx: Melanoma of skin (Multi)    2 Result Notes      Component    FINAL DIAGNOSIS   SKIN, RIGHT DISTAL CALF, EXCISION:  CHANGES CONSISTENT WITH PREVIOUS PROCEDURE, INKED MARGINS FREE IN THE PLANES OF SECTIONS EXAMINED, WITHOUT RESIDUAL ATYPICAL MELANOCYTIC NEOPLASM SEEN.     ** Electronically signed out by Gladys Nguyen MD **            Derm Consult: [id number]  Order: [id number]   Collected 12/20/2024 11:25       Status: Final result       Visible to patient: No (inaccessible in [facility])       Dx: Malignant melanoma of right lower johnson...    0 Result Notes      Component    FINAL DIAGNOSIS   6 SLIDES, [facility] OF [facility], #[id number] (BX:  11/05/2024)     SKIN, RIGHT DISTAL CALF, SHAVE BIOPSY:  MALIGNANT MELANOMA, BRESLOW THICKNESS 0.2 MM, SEE NOTE.     Note: Microscopic examination reveals a specimen that extends into the superficial dermis. There is a proliferation of single and nested melanocytes along the dermal-epidermal junction with an area of moderate to significant pagetoid extension. There are occasional small melanocytes in the dermis. A SOX-10 stain reveals moderate to significant pagetoid extension and a significant increase in single melanocytes along the dermal-epidermal junction in addition to nests. The melanocytes stain with antibodies against PRAME, and occasional melanocytes stain with antibodies against p16. There is occasional to moderate HMB45 staining.      ** Electronically signed out by Gladys Nguyen MD **               Electronically signed by Gladys Nguyen MD on 12/20/2024 at 1431   Case Summary Report   MELANOMA OF THE SKIN: Biopsy   8th Edition - Protocol posted: 3/23/2022MELANOMA OF THE SKIN: BIOPSY - All Specimens  SPECIMEN   Procedure  Biopsy, shave   Specimen Laterality  Right   TUMOR   Tumor Site  Skin of lower limb and hip: Right distal calf        Histologic Type  Superficial spreading melanoma (low-cumulative sun damage (CSD) melanoma)   Maximum Tumor (Breslow) Thickness (Millimeters)  0.2 mm   Ulceration  Not identified   Anatomic (Cedric) Level  II (melanoma present in but does not fill and expand papillary dermis)   Mitotic Rate  None identified   Microsatellite(s)  Not identified   Lymphovascular Invasion  Not identified   Neurotropism  Not identified   Tumor-Infiltrating Lymphocytes  Present, nonbrisk   Tumor Regression  Not identified   MARGINS     Margin Status for Invasive Melanoma  All margins negative for invasive melanoma   Margin Status for Melanoma in situ  All margins negative for melanoma in situ   PATHOLOGIC STAGE CLASSIFICATION (pTNM, AJCC 8th Edition)     pT Category  pT1a   .

## 2025-04-14 ENCOUNTER — TUMOR BOARD CONFERENCE (OUTPATIENT)
Dept: HEMATOLOGY/ONCOLOGY | Facility: HOSPITAL | Age: 55
End: 2025-04-14
Payer: COMMERCIAL